# Patient Record
Sex: FEMALE | Race: WHITE | Employment: OTHER | URBAN - METROPOLITAN AREA
[De-identification: names, ages, dates, MRNs, and addresses within clinical notes are randomized per-mention and may not be internally consistent; named-entity substitution may affect disease eponyms.]

---

## 2017-11-07 PROBLEM — S46.911A RIGHT SHOULDER STRAIN: Status: ACTIVE | Noted: 2017-11-07

## 2019-01-24 PROBLEM — R10.9 LEFT FLANK PAIN: Status: ACTIVE | Noted: 2019-01-24

## 2019-01-24 PROBLEM — S46.911A RIGHT SHOULDER STRAIN: Status: RESOLVED | Noted: 2017-11-07 | Resolved: 2019-01-24

## 2019-01-24 PROBLEM — N20.0 KIDNEY STONE: Status: ACTIVE | Noted: 2019-01-24

## 2019-01-24 PROBLEM — M54.32 SCIATICA, LEFT SIDE: Status: ACTIVE | Noted: 2019-01-24

## 2019-01-24 PROBLEM — S46.001A ROTATOR CUFF INJURY, RIGHT, INITIAL ENCOUNTER: Status: RESOLVED | Noted: 2017-11-07 | Resolved: 2019-01-24

## 2020-01-07 PROBLEM — M54.32 SCIATICA, LEFT SIDE: Status: RESOLVED | Noted: 2019-01-24 | Resolved: 2020-01-07

## 2020-01-07 PROBLEM — R10.84 GENERALIZED ABDOMINAL PAIN: Status: RESOLVED | Noted: 2019-10-29 | Resolved: 2020-01-07

## 2020-11-20 DIAGNOSIS — R05.9 COUGH: ICD-10-CM

## 2021-01-07 PROBLEM — E78.49 OTHER HYPERLIPIDEMIA: Status: ACTIVE | Noted: 2021-01-07

## 2021-01-12 DIAGNOSIS — E55.9 VITAMIN D DEFICIENCY: ICD-10-CM

## 2021-01-12 DIAGNOSIS — E78.49 OTHER HYPERLIPIDEMIA: ICD-10-CM

## 2021-01-12 DIAGNOSIS — Z00.01 ENCOUNTER FOR GENERAL ADULT MEDICAL EXAMINATION WITH ABNORMAL FINDINGS: ICD-10-CM

## 2021-01-12 LAB
ALBUMIN SERPL-MCNC: 4.4 G/DL (ref 3.5–5.2)
ALP BLD-CCNC: 51 U/L (ref 35–104)
ALT SERPL-CCNC: 17 U/L (ref 0–32)
ANION GAP SERPL CALCULATED.3IONS-SCNC: 15 MMOL/L (ref 7–16)
AST SERPL-CCNC: 24 U/L (ref 0–31)
BILIRUB SERPL-MCNC: 0.4 MG/DL (ref 0–1.2)
BUN BLDV-MCNC: 14 MG/DL (ref 8–23)
CALCIUM SERPL-MCNC: 9.8 MG/DL (ref 8.6–10.2)
CHLORIDE BLD-SCNC: 108 MMOL/L (ref 98–107)
CHOLESTEROL, TOTAL: 233 MG/DL (ref 0–199)
CO2: 20 MMOL/L (ref 22–29)
CREAT SERPL-MCNC: 0.7 MG/DL (ref 0.5–1)
GFR AFRICAN AMERICAN: >60
GFR NON-AFRICAN AMERICAN: >60 ML/MIN/1.73
GLUCOSE BLD-MCNC: 89 MG/DL (ref 74–99)
HCT VFR BLD CALC: 40 % (ref 34–48)
HDLC SERPL-MCNC: 47 MG/DL
HEMOGLOBIN: 13 G/DL (ref 11.5–15.5)
LDL CHOLESTEROL CALCULATED: 159 MG/DL (ref 0–99)
MCH RBC QN AUTO: 30.1 PG (ref 26–35)
MCHC RBC AUTO-ENTMCNC: 32.5 % (ref 32–34.5)
MCV RBC AUTO: 92.6 FL (ref 80–99.9)
PDW BLD-RTO: 12.4 FL (ref 11.5–15)
PLATELET # BLD: 239 E9/L (ref 130–450)
PMV BLD AUTO: 11.2 FL (ref 7–12)
POTASSIUM SERPL-SCNC: 4.1 MMOL/L (ref 3.5–5)
RBC # BLD: 4.32 E12/L (ref 3.5–5.5)
SODIUM BLD-SCNC: 143 MMOL/L (ref 132–146)
TOTAL PROTEIN: 7.4 G/DL (ref 6.4–8.3)
TRIGL SERPL-MCNC: 133 MG/DL (ref 0–149)
TSH SERPL DL<=0.05 MIU/L-ACNC: 1.98 UIU/ML (ref 0.27–4.2)
VITAMIN D 25-HYDROXY: 28 NG/ML (ref 30–100)
VLDLC SERPL CALC-MCNC: 27 MG/DL
WBC # BLD: 5.2 E9/L (ref 4.5–11.5)

## 2023-01-23 PROBLEM — I47.10 SUPRAVENTRICULAR TACHYCARDIA: Status: ACTIVE | Noted: 2023-01-23

## 2023-01-24 PROBLEM — F41.9 ANXIETY: Status: ACTIVE | Noted: 2023-01-24

## 2023-01-24 PROBLEM — I47.19 ATRIAL TACHYCARDIA: Status: ACTIVE | Noted: 2023-01-23

## 2023-02-02 PROBLEM — R00.0 TACHYCARDIA: Status: RESOLVED | Noted: 2023-01-23 | Resolved: 2023-02-02

## 2023-05-04 PROBLEM — I47.10 SUPRAVENTRICULAR TACHYCARDIA: Status: RESOLVED | Noted: 2023-01-23 | Resolved: 2023-05-04

## 2023-05-04 PROBLEM — I47.1 SUPRAVENTRICULAR TACHYCARDIA (HCC): Status: RESOLVED | Noted: 2023-01-23 | Resolved: 2023-05-04

## 2023-05-04 PROBLEM — E78.00 PURE HYPERCHOLESTEROLEMIA: Status: RESOLVED | Noted: 2023-01-23 | Resolved: 2023-05-04

## 2023-05-15 PROBLEM — I87.2 VENOUS INSUFFICIENCY OF BOTH LOWER EXTREMITIES: Status: ACTIVE | Noted: 2023-05-15

## 2023-06-12 PROBLEM — F32.A ANXIETY AND DEPRESSION: Status: ACTIVE | Noted: 2023-01-24

## 2023-06-12 PROBLEM — U09.9 COVID-19 LONG HAULER MANIFESTING CHRONIC CONCENTRATION DEFICIT: Status: ACTIVE | Noted: 2023-06-12

## 2023-06-12 PROBLEM — F80.2: Status: ACTIVE | Noted: 2023-06-12

## 2023-06-12 PROBLEM — T88.1XXA POST-IMMUNIZATION REACTION: Status: ACTIVE | Noted: 2023-06-12

## 2023-07-18 DIAGNOSIS — K51.00 ULCERATIVE PANCOLITIS WITHOUT COMPLICATION (HCC): ICD-10-CM

## 2023-07-18 LAB
C-REACTIVE PROTEIN: <3 MG/L (ref 0–5)
FOLATE: >20 NG/ML (ref 4.8–24.2)
IRON SATURATION: 38 % (ref 15–50)
IRON: 103 UG/DL (ref 37–145)
SEDIMENTATION RATE, ERYTHROCYTE: 21 MM/HR (ref 0–20)
TOTAL IRON BINDING CAPACITY: 273 UG/DL (ref 250–450)
VITAMIN B-12: 533 PG/ML (ref 211–946)
VITAMIN D 25-HYDROXY: 71.3 NG/ML (ref 30–100)

## 2023-08-09 NOTE — PROGRESS NOTES
[address] PRE-ADMISSION TESTING INSTRUCTIONS    The Preadmission Testing patient is instructed accordingly using the following criteria (check applicable):    ARRIVAL INSTRUCTIONS:  [x] Parking the day of Surgery is located in the Main Entrance lot. Upon entering the door, make an immediate right to the surgery reception desk    [x] Bring photo ID and insurance card    [] Bring in a copy of Living will or Durable Power of  papers. [x] Please be sure to arrange for responsible adult to provide transportation to and from the hospital    [x] Please arrange for responsible adult to be with you for the 24 hour period post procedure due to having anesthesia    [x] If you awake am of surgery not feeling well or have temperature >100 please call [phone]    GENERAL INSTRUCTIONS:    [x] Nothing by mouth after midnight, including gum, candy, mints or water    [x] You may brush your teeth, but do not swallow any water    [x] Take medications as instructed with 1-2 oz of water    [x] Stop herbal supplements and vitamins 5 days prior to procedure    [] Follow preop dosing of blood thinners per physician instructions    [] Take 1/2 dose of evening insulin, but no insulin after midnight    [] No oral diabetic medications after midnight    [] If diabetic and have low blood sugar or feel symptomatic, take 1-2oz apple juice only    [] Bring inhalers day of surgery    [] Bring C-PAP/ Bi-Pap day of surgery    [] Bring urine specimen day of surgery    [x] Shower or bath with soap, lather and rinse well, AM of Surgery, no lotion, powders or creams to surgical site    [x] Follow bowel prep as instructed per surgeon    [x] No tobacco products within 24 hours of surgery     [x] No alcohol or illegal drug use within 24 hours of surgery.     [x] Jewelry, body piercing's, eyeglasses, contact lenses and dentures are not permitted into surgery (bring cases)      [x] Please do not wear any nail polish,

## 2023-08-14 ENCOUNTER — ANESTHESIA EVENT (OUTPATIENT)
Dept: ENDOSCOPY | Age: 68
End: 2023-08-14
Payer: COMMERCIAL

## 2023-08-14 ENCOUNTER — ANESTHESIA (OUTPATIENT)
Dept: ENDOSCOPY | Age: 68
End: 2023-08-14
Payer: COMMERCIAL

## 2023-08-14 ENCOUNTER — HOSPITAL ENCOUNTER (OUTPATIENT)
Age: 68
Setting detail: OUTPATIENT SURGERY
Discharge: HOME | End: 2023-08-14
Attending: STUDENT IN AN ORGANIZED HEALTH CARE EDUCATION/TRAINING PROGRAM | Admitting: STUDENT IN AN ORGANIZED HEALTH CARE EDUCATION/TRAINING PROGRAM
Payer: COMMERCIAL

## 2023-08-14 VITALS
HEART RATE: 69 BPM | SYSTOLIC BLOOD PRESSURE: 126 MMHG | OXYGEN SATURATION: 99 % | BODY MASS INDEX: 25.03 KG/M2 | TEMPERATURE: 96.8 F | DIASTOLIC BLOOD PRESSURE: 61 MMHG | RESPIRATION RATE: 18 BRPM | HEIGHT: 62 IN | WEIGHT: 136 LBS

## 2023-08-14 DIAGNOSIS — K51.90 ULCERATIVE COLITIS (HCC): ICD-10-CM

## 2023-08-14 PROCEDURE — 3609010300 HC COLONOSCOPY W/BIOPSY SINGLE/MULTIPLE: Performed by: STUDENT IN AN ORGANIZED HEALTH CARE EDUCATION/TRAINING PROGRAM

## 2023-08-14 PROCEDURE — 7100000010 HC PHASE II RECOVERY - FIRST 15 MIN: Performed by: STUDENT IN AN ORGANIZED HEALTH CARE EDUCATION/TRAINING PROGRAM

## 2023-08-14 PROCEDURE — 2580000003 HC RX 258: Performed by: NURSE ANESTHETIST, CERTIFIED REGISTERED

## 2023-08-14 PROCEDURE — 6360000002 HC RX W HCPCS: Performed by: NURSE ANESTHETIST, CERTIFIED REGISTERED

## 2023-08-14 PROCEDURE — 2709999900 HC NON-CHARGEABLE SUPPLY: Performed by: STUDENT IN AN ORGANIZED HEALTH CARE EDUCATION/TRAINING PROGRAM

## 2023-08-14 PROCEDURE — 3700000000 HC ANESTHESIA ATTENDED CARE: Performed by: STUDENT IN AN ORGANIZED HEALTH CARE EDUCATION/TRAINING PROGRAM

## 2023-08-14 PROCEDURE — 2500000003 HC RX 250 WO HCPCS: Performed by: NURSE ANESTHETIST, CERTIFIED REGISTERED

## 2023-08-14 PROCEDURE — 7100000000 HC PACU RECOVERY - FIRST 15 MIN: Performed by: STUDENT IN AN ORGANIZED HEALTH CARE EDUCATION/TRAINING PROGRAM

## 2023-08-14 PROCEDURE — 7100000011 HC PHASE II RECOVERY - ADDTL 15 MIN: Performed by: STUDENT IN AN ORGANIZED HEALTH CARE EDUCATION/TRAINING PROGRAM

## 2023-08-14 PROCEDURE — 3700000001 HC ADD 15 MINUTES (ANESTHESIA): Performed by: STUDENT IN AN ORGANIZED HEALTH CARE EDUCATION/TRAINING PROGRAM

## 2023-08-14 PROCEDURE — 45380 COLONOSCOPY AND BIOPSY: CPT | Performed by: STUDENT IN AN ORGANIZED HEALTH CARE EDUCATION/TRAINING PROGRAM

## 2023-08-14 RX ORDER — SODIUM CHLORIDE 0.9 % (FLUSH) 0.9 %
5-40 SYRINGE (ML) INJECTION EVERY 12 HOURS SCHEDULED
Status: CANCELLED | OUTPATIENT
Start: 2023-08-14

## 2023-08-14 RX ORDER — ONDANSETRON 2 MG/ML
4 INJECTION INTRAMUSCULAR; INTRAVENOUS EVERY 6 HOURS PRN
Status: CANCELLED | OUTPATIENT
Start: 2023-08-14

## 2023-08-14 RX ORDER — SODIUM CHLORIDE 9 MG/ML
INJECTION, SOLUTION INTRAVENOUS PRN
Status: CANCELLED | OUTPATIENT
Start: 2023-08-14

## 2023-08-14 RX ORDER — SODIUM CHLORIDE 0.9 % (FLUSH) 0.9 %
5-40 SYRINGE (ML) INJECTION PRN
Status: CANCELLED | OUTPATIENT
Start: 2023-08-14

## 2023-08-14 RX ORDER — LIDOCAINE HYDROCHLORIDE 20 MG/ML
INJECTION, SOLUTION INFILTRATION; PERINEURAL PRN
Status: DISCONTINUED | OUTPATIENT
Start: 2023-08-14 | End: 2023-08-14 | Stop reason: SDUPTHER

## 2023-08-14 RX ORDER — PROPOFOL 10 MG/ML
INJECTION, EMULSION INTRAVENOUS PRN
Status: DISCONTINUED | OUTPATIENT
Start: 2023-08-14 | End: 2023-08-14 | Stop reason: SDUPTHER

## 2023-08-14 RX ORDER — SODIUM CHLORIDE 9 MG/ML
INJECTION, SOLUTION INTRAVENOUS CONTINUOUS PRN
Status: DISCONTINUED | OUTPATIENT
Start: 2023-08-14 | End: 2023-08-14 | Stop reason: SDUPTHER

## 2023-08-14 RX ORDER — ONDANSETRON 4 MG/1
4 TABLET, ORALLY DISINTEGRATING ORAL EVERY 8 HOURS PRN
Status: CANCELLED | OUTPATIENT
Start: 2023-08-14

## 2023-08-14 RX ADMIN — SODIUM CHLORIDE: 9 INJECTION, SOLUTION INTRAVENOUS at 08:56

## 2023-08-14 RX ADMIN — LIDOCAINE HYDROCHLORIDE 40 MG: 20 INJECTION, SOLUTION INFILTRATION; PERINEURAL at 08:56

## 2023-08-14 RX ADMIN — PROPOFOL 300 MG: 10 INJECTION, EMULSION INTRAVENOUS at 08:56

## 2023-08-14 ASSESSMENT — PAIN - FUNCTIONAL ASSESSMENT: PAIN_FUNCTIONAL_ASSESSMENT: NONE - DENIES PAIN

## 2023-08-14 NOTE — H&P
[facility] ([facility])   Gastroenterology, Hepatology, &  Advanced Endoscopy    Consult       ASSESSMENT AND PLAN:    68y/F with history of Ulcerative Colitis presents for endoscopic surveillance. PLAN:  Colonoscopy today        HISTORY OF PRESENT ILLNESS:      Ms. Paige Garcia is a 68y/F who presents to clinic in follow-up for Ulcerative Colitis. She is currently maintained on mesalamine therapy and reports doing very well. She had a hospitalization for flare-up in the setting of C diff which responded well to steroid therapy and vancomycin. She is having very regular bowel movements with no associated blood in the stool, diarrhea, pain, or urgency. Notably, she also was having mental fog thought secondary to Covid which has been slowly improving. Past Medical History:        Diagnosis Date    Encounter for screening colonoscopy     Hyperlipidemia     Hypertension     Memory loss     Migraines     Vitamin D deficiency 11/11/2016     Past Surgical History:        Procedure Laterality Date    BREAST REDUCTION SURGERY Bilateral 1986    BREAST SURGERY Bilateral 1986    reduction    BREAST SURGERY Right 1981    rt breast exc bx benign    CARPAL TUNNEL RELEASE Bilateral     ROTATOR CUFF REPAIR      TONSILLECTOMY       Social History:    TOBACCO:   reports that she has quit smoking. Her smoking use included cigarettes. She has never used smokeless tobacco.  ETOH:   reports no history of alcohol use. DRUGS:   reports no history of drug use. Family History:       Problem Relation Age of Onset    Breast Cancer Sister 71    Breast Cancer Paternal Aunt 79    Lung Cancer Paternal Uncle     Lung Cancer Paternal Uncle        No current facility-administered medications for this encounter.     Current Outpatient Medications:     venlafaxine (EFFEXOR XR) 37.5 MG extended release capsule, Take 1 capsule by mouth daily, Disp: 30 capsule, Rfl: 1    rosuvastatin (CRESTOR) 5 MG tablet, TAKE ONE TABLET BY MOUTH DAILY, Disp: 90

## 2023-08-14 NOTE — ANESTHESIA POSTPROCEDURE EVALUATION
Department of Anesthesiology  Postprocedure Note    Patient: Darby Madrid  MRN: [id number]  YOB: 1955  Date of evaluation: 8/14/2023      Procedure Summary     Date: 08/14/23 Room / Location: [facility] / [facility]    Anesthesia Start: 3174 Anesthesia Stop: 8259    Procedure: COLONOSCOPY WITH BIOPSY Diagnosis:       Ulcerative colitis ([address])      (Ulcerative colitis ([address]) [K51.90])    Surgeons: Aline Maynard MD Responsible Provider: Theopolis Klinefelter, MD    Anesthesia Type: MAC ASA Status: 3          Anesthesia Type: No value filed.     Angel Phase I: Angel Score: 10    Angel Phase II:        Anesthesia Post Evaluation    Patient location during evaluation: PACU  Patient participation: complete - patient participated  Level of consciousness: awake  Pain score: 1  Airway patency: patent  Nausea & Vomiting: no nausea and no vomiting  Complications: no  Cardiovascular status: hemodynamically stable  Respiratory status: acceptable  Hydration status: euvolemic

## 2023-08-14 NOTE — DISCHARGE INSTRUCTIONS
Recommendations:  -The patient will be observed post procedure until all discharge criteria are met. -Patient has a contact number available for emergencies. The signs and symptoms of potential delayed complications were discussed with the patient.    -Return to normal activities tomorrow. -Written discharge instructions were provided to the patient.    -Await pathology/cytology results.  -Timeframe until next colonoscopy will be discussed in clinic and based on Clinical Guidelines regarding size, number, and pathology of polyps removed as well as adequacy of bowel prep.   -Clinic appointment to be scheduled.

## 2023-08-17 LAB — SURGICAL PATHOLOGY REPORT: NORMAL

## 2023-08-23 RX ORDER — ERGOCALCIFEROL 1.25 MG/1
CAPSULE ORAL
Qty: 4 CAPSULE | Refills: 0 | Status: SHIPPED | OUTPATIENT
Start: 2023-08-23

## 2023-09-18 RX ORDER — ERGOCALCIFEROL 1.25 MG/1
CAPSULE ORAL
Qty: 4 CAPSULE | Refills: 0 | Status: SHIPPED | OUTPATIENT
Start: 2023-09-18

## 2023-10-02 DIAGNOSIS — F41.9 ANXIETY AND DEPRESSION: ICD-10-CM

## 2023-10-02 DIAGNOSIS — F32.A ANXIETY AND DEPRESSION: ICD-10-CM

## 2023-10-03 RX ORDER — VENLAFAXINE HYDROCHLORIDE 37.5 MG/1
37.5 CAPSULE, EXTENDED RELEASE ORAL DAILY
Qty: 30 CAPSULE | Refills: 1 | Status: SHIPPED | OUTPATIENT
Start: 2023-10-03 | End: 2023-12-02

## 2023-10-10 ENCOUNTER — TELEPHONE (OUTPATIENT)
Dept: PRIMARY CARE CLINIC | Age: 68
End: 2023-10-10

## 2023-10-10 DIAGNOSIS — Z12.31 SCREENING MAMMOGRAM, ENCOUNTER FOR: Primary | ICD-10-CM

## 2023-10-16 RX ORDER — ERGOCALCIFEROL 1.25 MG/1
CAPSULE ORAL
Qty: 4 CAPSULE | Refills: 0 | Status: SHIPPED | OUTPATIENT
Start: 2023-10-16

## 2023-10-18 ENCOUNTER — OFFICE VISIT (OUTPATIENT)
Dept: GASTROENTEROLOGY | Age: 68
End: 2023-10-18
Payer: COMMERCIAL

## 2023-10-18 VITALS
HEART RATE: 58 BPM | WEIGHT: 134 LBS | BODY MASS INDEX: 24.51 KG/M2 | SYSTOLIC BLOOD PRESSURE: 122 MMHG | TEMPERATURE: 97.4 F | DIASTOLIC BLOOD PRESSURE: 78 MMHG | OXYGEN SATURATION: 98 %

## 2023-10-18 DIAGNOSIS — K51.80 OTHER ULCERATIVE COLITIS WITHOUT COMPLICATION (HCC): Primary | ICD-10-CM

## 2023-10-18 PROCEDURE — G8420 CALC BMI NORM PARAMETERS: HCPCS | Performed by: STUDENT IN AN ORGANIZED HEALTH CARE EDUCATION/TRAINING PROGRAM

## 2023-10-18 PROCEDURE — 1123F ACP DISCUSS/DSCN MKR DOCD: CPT | Performed by: STUDENT IN AN ORGANIZED HEALTH CARE EDUCATION/TRAINING PROGRAM

## 2023-10-18 PROCEDURE — 1090F PRES/ABSN URINE INCON ASSESS: CPT | Performed by: STUDENT IN AN ORGANIZED HEALTH CARE EDUCATION/TRAINING PROGRAM

## 2023-10-18 PROCEDURE — G8484 FLU IMMUNIZE NO ADMIN: HCPCS | Performed by: STUDENT IN AN ORGANIZED HEALTH CARE EDUCATION/TRAINING PROGRAM

## 2023-10-18 PROCEDURE — G8399 PT W/DXA RESULTS DOCUMENT: HCPCS | Performed by: STUDENT IN AN ORGANIZED HEALTH CARE EDUCATION/TRAINING PROGRAM

## 2023-10-18 PROCEDURE — 3017F COLORECTAL CA SCREEN DOC REV: CPT | Performed by: STUDENT IN AN ORGANIZED HEALTH CARE EDUCATION/TRAINING PROGRAM

## 2023-10-18 PROCEDURE — 99212 OFFICE O/P EST SF 10 MIN: CPT | Performed by: STUDENT IN AN ORGANIZED HEALTH CARE EDUCATION/TRAINING PROGRAM

## 2023-10-18 PROCEDURE — 1036F TOBACCO NON-USER: CPT | Performed by: STUDENT IN AN ORGANIZED HEALTH CARE EDUCATION/TRAINING PROGRAM

## 2023-10-18 PROCEDURE — G8427 DOCREV CUR MEDS BY ELIG CLIN: HCPCS | Performed by: STUDENT IN AN ORGANIZED HEALTH CARE EDUCATION/TRAINING PROGRAM

## 2023-10-30 RX ORDER — ERGOCALCIFEROL 1.25 MG/1
CAPSULE ORAL
Qty: 4 CAPSULE | Refills: 0 | Status: SHIPPED | OUTPATIENT
Start: 2023-10-30

## 2023-11-06 RX ORDER — ROSUVASTATIN CALCIUM 5 MG/1
TABLET, COATED ORAL
Qty: 90 TABLET | Refills: 0 | Status: SHIPPED | OUTPATIENT
Start: 2023-11-06

## 2023-11-13 ENCOUNTER — OFFICE VISIT (OUTPATIENT)
Dept: PRIMARY CARE CLINIC | Age: 68
End: 2023-11-13
Payer: COMMERCIAL

## 2023-11-13 VITALS
WEIGHT: 134 LBS | RESPIRATION RATE: 16 BRPM | HEIGHT: 62 IN | HEART RATE: 52 BPM | SYSTOLIC BLOOD PRESSURE: 116 MMHG | OXYGEN SATURATION: 99 % | DIASTOLIC BLOOD PRESSURE: 68 MMHG | BODY MASS INDEX: 24.66 KG/M2

## 2023-11-13 DIAGNOSIS — K51.919 ULCERATIVE COLITIS WITH COMPLICATION, UNSPECIFIED LOCATION (HCC): ICD-10-CM

## 2023-11-13 DIAGNOSIS — Z00.01 ENCOUNTER FOR GENERAL ADULT MEDICAL EXAMINATION WITH ABNORMAL FINDINGS: Primary | ICD-10-CM

## 2023-11-13 DIAGNOSIS — F41.9 ANXIETY: ICD-10-CM

## 2023-11-13 DIAGNOSIS — R53.82 CHRONIC FATIGUE: ICD-10-CM

## 2023-11-13 DIAGNOSIS — E55.9 VITAMIN D DEFICIENCY: ICD-10-CM

## 2023-11-13 DIAGNOSIS — E78.2 MIXED HYPERLIPIDEMIA: ICD-10-CM

## 2023-11-13 DIAGNOSIS — G47.09 OTHER INSOMNIA: ICD-10-CM

## 2023-11-13 DIAGNOSIS — I87.2 VENOUS INSUFFICIENCY OF BOTH LOWER EXTREMITIES: ICD-10-CM

## 2023-11-13 PROCEDURE — 99397 PER PM REEVAL EST PAT 65+ YR: CPT | Performed by: FAMILY MEDICINE

## 2023-11-13 PROCEDURE — G8484 FLU IMMUNIZE NO ADMIN: HCPCS | Performed by: FAMILY MEDICINE

## 2023-11-13 ASSESSMENT — ENCOUNTER SYMPTOMS
COLOR CHANGE: 0
BACK PAIN: 0
RHINORRHEA: 0
COUGH: 0
CONSTIPATION: 0
SHORTNESS OF BREATH: 0
VOMITING: 0
APNEA: 0
NAUSEA: 0
SINUS PRESSURE: 0
EYE PAIN: 0
VISUAL CHANGE: 0
DIARRHEA: 0
SORE THROAT: 0
EYE REDNESS: 0
EYE ITCHING: 0
WHEEZING: 0
ABDOMINAL PAIN: 0
CHEST TIGHTNESS: 0
BLOOD IN STOOL: 0

## 2023-11-13 NOTE — PROGRESS NOTES
Pupils: Pupils are equal, round, and reactive to light. Neck:      Thyroid: No thyromegaly. Cardiovascular:      Rate and Rhythm: Normal rate and regular rhythm. Heart sounds: Normal heart sounds. No murmur heard. Pulmonary:      Effort: Pulmonary effort is normal. No respiratory distress. Breath sounds: Normal breath sounds. No wheezing or rales. Abdominal:      General: Bowel sounds are normal. There is no distension. Palpations: Abdomen is soft. Tenderness: There is no abdominal tenderness. Musculoskeletal:         General: No tenderness. Normal range of motion. Cervical back: Normal range of motion and neck supple. No rigidity. No muscular tenderness. Lymphadenopathy:      Cervical: No cervical adenopathy. Skin:     General: Skin is warm and dry. Findings: No erythema or rash. Neurological:      General: No focal deficit present. Mental Status: She is alert and oriented to person, place, and time. Cranial Nerves: No cranial nerve deficit. Deep Tendon Reflexes: Reflexes are normal and symmetric. Reflexes normal.   Psychiatric:         Mood and Affect: Mood normal.                                 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 Diagnoses and all orders for this visit:    Encounter for general adult medical examination with abnormal findings  -     Comprehensive Metabolic Panel;  Future  -     Lipid Panel;

## 2023-11-14 ENCOUNTER — HOSPITAL ENCOUNTER (OUTPATIENT)
Dept: GENERAL RADIOLOGY | Age: 68
Discharge: HOME | End: 2023-11-16
Payer: COMMERCIAL

## 2023-11-14 VITALS — BODY MASS INDEX: 24.66 KG/M2 | WEIGHT: 134 LBS | HEIGHT: 62 IN

## 2023-11-14 DIAGNOSIS — Z12.31 SCREENING MAMMOGRAM, ENCOUNTER FOR: ICD-10-CM

## 2023-11-14 PROCEDURE — 77063 BREAST TOMOSYNTHESIS BI: CPT

## 2023-11-14 RX ORDER — ERGOCALCIFEROL 1.25 MG/1
CAPSULE ORAL
Qty: 4 CAPSULE | Refills: 0 | Status: SHIPPED | OUTPATIENT
Start: 2023-11-14

## 2023-11-15 ENCOUNTER — PATIENT MESSAGE (OUTPATIENT)
Dept: GASTROENTEROLOGY | Age: 68
End: 2023-11-15

## 2023-11-15 RX ORDER — MESALAMINE 1.2 G/1
1200 TABLET, DELAYED RELEASE ORAL
Qty: 30 TABLET | Refills: 5 | Status: SHIPPED | OUTPATIENT
Start: 2023-11-15

## 2023-12-11 RX ORDER — ERGOCALCIFEROL 1.25 MG/1
CAPSULE ORAL
Qty: 4 CAPSULE | Refills: 0 | Status: SHIPPED
Start: 2023-12-11 | End: 2024-01-08

## 2024-01-05 PROCEDURE — 99285 EMERGENCY DEPT VISIT HI MDM: CPT

## 2024-01-06 ENCOUNTER — APPOINTMENT (OUTPATIENT)
Dept: GENERAL RADIOLOGY | Age: 69
End: 2024-01-06
Payer: COMMERCIAL

## 2024-01-06 ENCOUNTER — HOSPITAL ENCOUNTER (EMERGENCY)
Age: 69
Discharge: HOME | End: 2024-01-06
Attending: STUDENT IN AN ORGANIZED HEALTH CARE EDUCATION/TRAINING PROGRAM
Payer: COMMERCIAL

## 2024-01-06 VITALS
HEIGHT: 62 IN | HEART RATE: 56 BPM | SYSTOLIC BLOOD PRESSURE: 117 MMHG | TEMPERATURE: 97.9 F | WEIGHT: 137 LBS | OXYGEN SATURATION: 96 % | RESPIRATION RATE: 16 BRPM | DIASTOLIC BLOOD PRESSURE: 69 MMHG | BODY MASS INDEX: 25.21 KG/M2

## 2024-01-06 DIAGNOSIS — L50.9 URTICARIA: ICD-10-CM

## 2024-01-06 DIAGNOSIS — R07.89 ATYPICAL CHEST PAIN: Primary | ICD-10-CM

## 2024-01-06 LAB
ALBUMIN SERPL-MCNC: 4.6 G/DL (ref 3.5–5.2)
ALP SERPL-CCNC: 52 U/L (ref 35–104)
ALT SERPL-CCNC: 26 U/L (ref 0–32)
ANION GAP SERPL CALCULATED.3IONS-SCNC: 10 MMOL/L (ref 7–16)
AST SERPL-CCNC: 28 U/L (ref 0–31)
BASOPHILS # BLD: 0.04 K/UL (ref 0–0.2)
BASOPHILS NFR BLD: 1 % (ref 0–2)
BILIRUB SERPL-MCNC: 0.4 MG/DL (ref 0–1.2)
BUN SERPL-MCNC: 18 MG/DL (ref 6–23)
CALCIUM SERPL-MCNC: 9.8 MG/DL (ref 8.6–10.2)
CHLORIDE SERPL-SCNC: 104 MMOL/L (ref 98–107)
CO2 SERPL-SCNC: 27 MMOL/L (ref 22–29)
CREAT SERPL-MCNC: 0.8 MG/DL (ref 0.5–1)
EOSINOPHIL # BLD: 0.15 K/UL (ref 0.05–0.5)
EOSINOPHILS RELATIVE PERCENT: 2 % (ref 0–6)
ERYTHROCYTE [DISTWIDTH] IN BLOOD BY AUTOMATED COUNT: 11.9 % (ref 11.5–15)
GFR SERPL CREATININE-BSD FRML MDRD: >60 ML/MIN/1.73M2
GLUCOSE SERPL-MCNC: 100 MG/DL (ref 74–99)
HCT VFR BLD AUTO: 36.1 % (ref 34–48)
HGB BLD-MCNC: 12 G/DL (ref 11.5–15.5)
IMM GRANULOCYTES # BLD AUTO: <0.03 K/UL (ref 0–0.58)
IMM GRANULOCYTES NFR BLD: 0 % (ref 0–5)
LYMPHOCYTES NFR BLD: 2.17 K/UL (ref 1.5–4)
LYMPHOCYTES RELATIVE PERCENT: 35 % (ref 20–42)
MCH RBC QN AUTO: 31.3 PG (ref 26–35)
MCHC RBC AUTO-ENTMCNC: 33.2 G/DL (ref 32–34.5)
MCV RBC AUTO: 94 FL (ref 80–99.9)
MONOCYTES NFR BLD: 0.54 K/UL (ref 0.1–0.95)
MONOCYTES NFR BLD: 9 % (ref 2–12)
NEUTROPHILS NFR BLD: 53 % (ref 43–80)
NEUTS SEG NFR BLD: 3.24 K/UL (ref 1.8–7.3)
PLATELET # BLD AUTO: 210 K/UL (ref 130–450)
PMV BLD AUTO: 9.8 FL (ref 7–12)
POTASSIUM SERPL-SCNC: 3.8 MMOL/L (ref 3.5–5)
PROT SERPL-MCNC: 7.4 G/DL (ref 6.4–8.3)
RBC # BLD AUTO: 3.84 M/UL (ref 3.5–5.5)
SODIUM SERPL-SCNC: 141 MMOL/L (ref 132–146)
TROPONIN I SERPL HS-MCNC: 7 NG/L (ref 0–9)
TROPONIN I SERPL HS-MCNC: <6 NG/L (ref 0–9)
WBC OTHER # BLD: 6.2 K/UL (ref 4.5–11.5)

## 2024-01-06 PROCEDURE — 71045 X-RAY EXAM CHEST 1 VIEW: CPT

## 2024-01-06 PROCEDURE — 80053 COMPREHEN METABOLIC PANEL: CPT

## 2024-01-06 PROCEDURE — 84484 ASSAY OF TROPONIN QUANT: CPT

## 2024-01-06 PROCEDURE — 93005 ELECTROCARDIOGRAM TRACING: CPT | Performed by: STUDENT IN AN ORGANIZED HEALTH CARE EDUCATION/TRAINING PROGRAM

## 2024-01-06 PROCEDURE — 85025 COMPLETE CBC W/AUTO DIFF WBC: CPT

## 2024-01-06 ASSESSMENT — LIFESTYLE VARIABLES
HOW OFTEN DO YOU HAVE A DRINK CONTAINING ALCOHOL: NEVER
HOW MANY STANDARD DRINKS CONTAINING ALCOHOL DO YOU HAVE ON A TYPICAL DAY: PATIENT DOES NOT DRINK

## 2024-01-06 NOTE — ED PROVIDER NOTES
[facility] EMERGENCY DEPARTMENT  EMERGENCY DEPARTMENT ENCOUNTER        Pt Name: Lorrie Burns  MRN: [id number]  Birthdate 1955  Date of evaluation: 1/5/2024  Provider: Dodie Lorenz DO  PCP: Tray Golden DO  Note Started: 1:02 AM EST 1/6/24    CHIEF COMPLAINT       Chief Complaint   Patient presents with    Urticaria     States started last Friday, states has chest pain \"feels like I am being sat on.\" States SOB comes and goes. States took benadryl at 2230.     Chest Pain       HISTORY OF PRESENT ILLNESS: 1 or more Elements   History From: patient    Limitations to history : None    Lorrie Burns is a 68 y.o. female with a history of anxiety and depression and chronic fatigue and psychosensory aphasia presenting to the emergency department complaining of chest pain and urticaria.  Patient states that she was in with her  watching jeopardy when she states that she was sitting with her  watching jeopardy when she felt itchy everywhere and a tightness in her chest.  She did take 2 Benadryl prior to arrival with some improvement.  Denies any fevers, numbness, tingling, unilateral weakness, abdominal pain, nausea, vomiting, diarrhea, history of similar episodes in the past, recent hospitalization, recent illness, or other acute symptoms or concerns.    Nursing Notes were all reviewed and agreed with or any disagreements were addressed in the HPI.    REVIEW OF SYSTEMS :      Review of Systems    Positives and Pertinent negatives as per HPI.     SURGICAL HISTORY     Past Surgical History:   Procedure Laterality Date    BREAST BIOPSY      BREAST REDUCTION SURGERY Bilateral 1986    BREAST SURGERY Bilateral 1986    reduction    BREAST SURGERY Right 1981    rt breast exc bx benign    CARPAL TUNNEL RELEASE Bilateral     COLONOSCOPY N/A 08/14/2023    COLONOSCOPY WITH BIOPSY performed by Tone Layton MD at [facility] ENDOSCOPY    ROTATOR  acute abnormal findings.  Patient remained asymptomatic while in the emergency department.  There was no rash or hives.  No evidence of anaphylaxis.  No allergic reaction while in the emergency department.  Patient was in no distress and remained asymptomatic the entire time.  Patient discharged in stable condition with strict return precautions.  Heart score is 2.  Does not seem cardiac in nature at this time.  She is completely asymptomatic and chest pain-free.  She states it was more of a tightness when she got the hives earlier.          CONSULTS: (Who and What was discussed)  None        I am the Primary Clinician of Record.    FINAL IMPRESSION      1. Atypical chest pain    2. Urticaria          DISPOSITION/PLAN     DISPOSITION Decision To Discharge 01/06/2024 04:21:27 AM      PATIENT REFERRED TO:  Tray Golden DO  [address] [id number]  [phone]    Schedule an appointment as soon as possible for a visit         DISCHARGE MEDICATIONS:  Discharge Medication List as of 1/6/2024  4:25 AM          DISCONTINUED MEDICATIONS:  Discharge Medication List as of 1/6/2024  4:25 AM                 (Please note that portions of this note were completed with a voice recognition program.  Efforts were made to edit the dictations but occasionally words are mis-transcribed.)    Dodie Lorenz DO (electronically signed)           Dodie Lorenz DO  01/06/24 0657       Dodie Lorenz DO  01/06/24 0659

## 2024-01-06 NOTE — DISCHARGE INSTRUCTIONS
Follow-up with family doctor.  Return for any significant worsening symptoms or other acute symptoms or concerns.

## 2024-01-08 LAB
EKG ATRIAL RATE: 54 BPM
EKG P AXIS: 28 DEGREES
EKG P-R INTERVAL: 170 MS
EKG Q-T INTERVAL: 418 MS
EKG QRS DURATION: 86 MS
EKG QTC CALCULATION (BAZETT): 396 MS
EKG R AXIS: -17 DEGREES
EKG T AXIS: 10 DEGREES
EKG VENTRICULAR RATE: 54 BPM

## 2024-01-08 PROCEDURE — 93010 ELECTROCARDIOGRAM REPORT: CPT | Performed by: INTERNAL MEDICINE

## 2024-01-08 RX ORDER — ERGOCALCIFEROL 1.25 MG/1
CAPSULE ORAL
Qty: 4 CAPSULE | Refills: 0 | Status: SHIPPED | OUTPATIENT
Start: 2024-01-08

## 2024-01-31 DIAGNOSIS — E78.2 MIXED HYPERLIPIDEMIA: ICD-10-CM

## 2024-01-31 DIAGNOSIS — E55.9 VITAMIN D DEFICIENCY: ICD-10-CM

## 2024-01-31 DIAGNOSIS — Z00.01 ENCOUNTER FOR GENERAL ADULT MEDICAL EXAMINATION WITH ABNORMAL FINDINGS: ICD-10-CM

## 2024-01-31 LAB
ALBUMIN SERPL-MCNC: 4.7 G/DL (ref 3.5–5.2)
ALP BLD-CCNC: 52 U/L (ref 35–104)
ALT SERPL-CCNC: 18 U/L (ref 0–32)
ANION GAP SERPL CALCULATED.3IONS-SCNC: 16 MMOL/L (ref 7–16)
AST SERPL-CCNC: 29 U/L (ref 0–31)
BILIRUB SERPL-MCNC: 0.4 MG/DL (ref 0–1.2)
BUN BLDV-MCNC: 13 MG/DL (ref 6–23)
CALCIUM SERPL-MCNC: 9.7 MG/DL (ref 8.6–10.2)
CHLORIDE BLD-SCNC: 103 MMOL/L (ref 98–107)
CHOLESTEROL: 194 MG/DL
CO2: 22 MMOL/L (ref 22–29)
CREAT SERPL-MCNC: 0.7 MG/DL (ref 0.5–1)
GFR SERPL CREATININE-BSD FRML MDRD: >60 ML/MIN/1.73M2
GLUCOSE BLD-MCNC: 90 MG/DL (ref 74–99)
HCT VFR BLD CALC: 37.8 % (ref 34–48)
HDLC SERPL-MCNC: 55 MG/DL
HEMOGLOBIN: 12.6 G/DL (ref 11.5–15.5)
LDL CHOLESTEROL: 117 MG/DL
MCH RBC QN AUTO: 31.5 PG (ref 26–35)
MCHC RBC AUTO-ENTMCNC: 33.3 G/DL (ref 32–34.5)
MCV RBC AUTO: 94.5 FL (ref 80–99.9)
PDW BLD-RTO: 11.6 % (ref 11.5–15)
PLATELET # BLD: 198 K/UL (ref 130–450)
PMV BLD AUTO: 10.6 FL (ref 7–12)
POTASSIUM SERPL-SCNC: 3.9 MMOL/L (ref 3.5–5)
RBC # BLD: 4 M/UL (ref 3.5–5.5)
SODIUM BLD-SCNC: 141 MMOL/L (ref 132–146)
TOTAL PROTEIN: 7.6 G/DL (ref 6.4–8.3)
TRIGL SERPL-MCNC: 112 MG/DL
TSH SERPL DL<=0.05 MIU/L-ACNC: 1.86 UIU/ML (ref 0.27–4.2)
VITAMIN D 25-HYDROXY: 93 NG/ML (ref 30–100)
VLDLC SERPL CALC-MCNC: 22 MG/DL
WBC # BLD: 6.4 K/UL (ref 4.5–11.5)

## 2024-02-03 RX ORDER — ERGOCALCIFEROL 1.25 MG/1
CAPSULE ORAL
Qty: 4 CAPSULE | Refills: 5 | Status: SHIPPED | OUTPATIENT
Start: 2024-02-03

## 2024-02-08 RX ORDER — ROSUVASTATIN CALCIUM 5 MG/1
TABLET, COATED ORAL
Qty: 90 TABLET | Refills: 0 | Status: SHIPPED | OUTPATIENT
Start: 2024-02-08

## 2024-02-15 RX ORDER — METOPROLOL TARTRATE 50 MG/1
50 TABLET, FILM COATED ORAL 2 TIMES DAILY
Qty: 180 TABLET | Refills: 3 | Status: SHIPPED | OUTPATIENT
Start: 2024-02-15

## 2024-02-22 ENCOUNTER — TELEPHONE (OUTPATIENT)
Dept: PRIMARY CARE CLINIC | Age: 69
End: 2024-02-22

## 2024-02-22 RX ORDER — AZITHROMYCIN 250 MG/1
TABLET, FILM COATED ORAL
Qty: 6 TABLET | Refills: 0 | Status: SHIPPED | OUTPATIENT
Start: 2024-02-22 | End: 2024-03-03

## 2024-03-01 ENCOUNTER — OFFICE VISIT (OUTPATIENT)
Dept: FAMILY MEDICINE CLINIC | Age: 69
End: 2024-03-01
Payer: COMMERCIAL

## 2024-03-01 VITALS
BODY MASS INDEX: 24.66 KG/M2 | DIASTOLIC BLOOD PRESSURE: 74 MMHG | SYSTOLIC BLOOD PRESSURE: 116 MMHG | HEART RATE: 62 BPM | WEIGHT: 134.8 LBS | OXYGEN SATURATION: 95 % | TEMPERATURE: 98.2 F

## 2024-03-01 DIAGNOSIS — M79.641 RIGHT HAND PAIN: Primary | ICD-10-CM

## 2024-03-01 DIAGNOSIS — M25.531 RIGHT WRIST PAIN: ICD-10-CM

## 2024-03-01 PROCEDURE — G8484 FLU IMMUNIZE NO ADMIN: HCPCS | Performed by: PHYSICIAN ASSISTANT

## 2024-03-01 PROCEDURE — 1036F TOBACCO NON-USER: CPT | Performed by: PHYSICIAN ASSISTANT

## 2024-03-01 PROCEDURE — G8399 PT W/DXA RESULTS DOCUMENT: HCPCS | Performed by: PHYSICIAN ASSISTANT

## 2024-03-01 PROCEDURE — 3017F COLORECTAL CA SCREEN DOC REV: CPT | Performed by: PHYSICIAN ASSISTANT

## 2024-03-01 PROCEDURE — 1090F PRES/ABSN URINE INCON ASSESS: CPT | Performed by: PHYSICIAN ASSISTANT

## 2024-03-01 PROCEDURE — 99204 OFFICE O/P NEW MOD 45 MIN: CPT | Performed by: PHYSICIAN ASSISTANT

## 2024-03-01 PROCEDURE — G8427 DOCREV CUR MEDS BY ELIG CLIN: HCPCS | Performed by: PHYSICIAN ASSISTANT

## 2024-03-01 PROCEDURE — 1123F ACP DISCUSS/DSCN MKR DOCD: CPT | Performed by: PHYSICIAN ASSISTANT

## 2024-03-01 PROCEDURE — G8420 CALC BMI NORM PARAMETERS: HCPCS | Performed by: PHYSICIAN ASSISTANT

## 2024-03-01 NOTE — PROGRESS NOTES
Cancer Paternal Aunt 70    Lung Cancer Paternal Uncle     Lung Cancer Paternal Uncle        Medications:     Current Outpatient Medications:     metoprolol tartrate (LOPRESSOR) 50 MG tablet, Take 1 tablet by mouth 2 times daily, Disp: 180 tablet, Rfl: 3    rosuvastatin (CRESTOR) 5 MG tablet, TAKE ONE TABLET BY MOUTH DAILY, Disp: 90 tablet, Rfl: 0    vitamin D (ERGOCALCIFEROL) 1.25 MG (83196 UT) CAPS capsule, TAKE ONE CAPSULE BY MOUTH ONCE A WEEK, Disp: 4 capsule, Rfl: 5    venlafaxine (EFFEXOR XR) 37.5 MG extended release capsule, Take 1 capsule by mouth daily, Disp: 30 capsule, Rfl: 11    mesalamine (LIALDA) 1.2 g EC tablet, Take 1 tablet by mouth daily (with breakfast), Disp: 30 tablet, Rfl: 5    Multiple Vitamins-Minerals (MULTIVITAMIN ADULT PO), Take 1 tablet by mouth daily, Disp: , Rfl:     Allergies:     Allergies   Allergen Reactions    Ativan [Lorazepam] Other (See Comments)     Hyperactivity, insomnia       Social History:     Social History     Tobacco Use    Smoking status: Former     Types: Cigarettes    Smokeless tobacco: Never   Vaping Use    Vaping Use: Never used   Substance Use Topics    Alcohol use: No    Drug use: No       Patient lives at home.    Physical Exam:     Vitals:    03/01/24 1422   BP: 116/74   Site: Right Upper Arm   Position: Sitting   Pulse: 62   Temp: 98.2 °F (36.8 °C)   TempSrc: Temporal   SpO2: 95%   Weight: 61.1 kg (134 lb 12.8 oz)       Exam:  Physical Exam  Vital signs reviewed and nurse's notes. The patient is not hypoxic.   General: Alert, no acute distress, patient resting comfortably   Skin: warm, intact, no pallor noted   Head: Normocephalic, atraumatic   Eye: Normal conjunctiva   Respiratory: No acute distress   Musculoskeletal: There is no obvious deformity noted to the right shoulder or to the right elbow.  The patient is not really able to move her right wrist or hand because of pain.  The patient has pain mostly to the radial aspect of the wrist with palpation.  We

## 2024-03-05 RX ORDER — METOPROLOL TARTRATE 50 MG/1
50 TABLET, FILM COATED ORAL 2 TIMES DAILY
Qty: 180 TABLET | Refills: 3 | Status: SHIPPED | OUTPATIENT
Start: 2024-03-05

## 2024-03-22 DIAGNOSIS — K51.80 OTHER ULCERATIVE COLITIS WITHOUT COMPLICATION (HCC): ICD-10-CM

## 2024-03-22 LAB
ABSOLUTE IMMATURE GRANULOCYTE: <0.03 K/UL (ref 0–0.58)
ALBUMIN SERPL-MCNC: 4.7 G/DL (ref 3.5–5.2)
ALP BLD-CCNC: 59 U/L (ref 35–104)
ALT SERPL-CCNC: 14 U/L (ref 0–32)
ANION GAP SERPL CALCULATED.3IONS-SCNC: 21 MMOL/L (ref 7–16)
AST SERPL-CCNC: 32 U/L (ref 0–31)
BASOPHILS ABSOLUTE: 0.06 K/UL (ref 0–0.2)
BASOPHILS RELATIVE PERCENT: 1 % (ref 0–2)
BILIRUB SERPL-MCNC: 0.3 MG/DL (ref 0–1.2)
BUN BLDV-MCNC: 17 MG/DL (ref 6–23)
C-REACTIVE PROTEIN: <3 MG/L (ref 0–5)
CALCIUM SERPL-MCNC: 10 MG/DL (ref 8.6–10.2)
CHLORIDE BLD-SCNC: 108 MMOL/L (ref 98–107)
CO2: 17 MMOL/L (ref 22–29)
CREAT SERPL-MCNC: 0.6 MG/DL (ref 0.5–1)
EOSINOPHILS ABSOLUTE: 0.33 K/UL (ref 0.05–0.5)
EOSINOPHILS RELATIVE PERCENT: 5 % (ref 0–6)
GFR SERPL CREATININE-BSD FRML MDRD: >60 ML/MIN/1.73M2
GLUCOSE BLD-MCNC: 85 MG/DL (ref 74–99)
HCT VFR BLD CALC: 40.3 % (ref 34–48)
HEMOGLOBIN: 13.6 G/DL (ref 11.5–15.5)
IMMATURE GRANULOCYTES: 0 % (ref 0–5)
IRON % SATURATION: 40 % (ref 15–50)
IRON: 121 UG/DL (ref 37–145)
LYMPHOCYTES ABSOLUTE: 2 K/UL (ref 1.5–4)
LYMPHOCYTES RELATIVE PERCENT: 32 % (ref 20–42)
MCH RBC QN AUTO: 31.9 PG (ref 26–35)
MCHC RBC AUTO-ENTMCNC: 33.7 G/DL (ref 32–34.5)
MCV RBC AUTO: 94.4 FL (ref 80–99.9)
MONOCYTES ABSOLUTE: 0.43 K/UL (ref 0.1–0.95)
MONOCYTES RELATIVE PERCENT: 7 % (ref 2–12)
NEUTROPHILS ABSOLUTE: 3.36 K/UL (ref 1.8–7.3)
NEUTROPHILS RELATIVE PERCENT: 54 % (ref 43–80)
PDW BLD-RTO: 12.3 % (ref 11.5–15)
PLATELET # BLD: 225 K/UL (ref 130–450)
PMV BLD AUTO: 11.1 FL (ref 7–12)
POTASSIUM SERPL-SCNC: 4.1 MMOL/L (ref 3.5–5)
RBC # BLD: 4.27 M/UL (ref 3.5–5.5)
SODIUM BLD-SCNC: 146 MMOL/L (ref 132–146)
TOTAL IRON BINDING CAPACITY: 304 UG/DL (ref 250–450)
TOTAL PROTEIN: 7.7 G/DL (ref 6.4–8.3)
VITAMIN D 25-HYDROXY: 73.1 NG/ML (ref 30–100)
WBC # BLD: 6.2 K/UL (ref 4.5–11.5)

## 2024-03-23 LAB
FOLATE: >20 NG/ML (ref 4.8–24.2)
SEDIMENTATION RATE, ERYTHROCYTE: 17 MM/HR (ref 0–20)
VITAMIN B-12: 756 PG/ML (ref 211–946)

## 2024-04-24 ENCOUNTER — OFFICE VISIT (OUTPATIENT)
Dept: GASTROENTEROLOGY | Age: 69
End: 2024-04-24
Payer: COMMERCIAL

## 2024-04-24 VITALS
DIASTOLIC BLOOD PRESSURE: 80 MMHG | SYSTOLIC BLOOD PRESSURE: 128 MMHG | HEART RATE: 60 BPM | BODY MASS INDEX: 24.87 KG/M2 | OXYGEN SATURATION: 96 % | WEIGHT: 136 LBS | TEMPERATURE: 97 F

## 2024-04-24 DIAGNOSIS — K51.80 OTHER ULCERATIVE COLITIS WITHOUT COMPLICATION (HCC): Primary | ICD-10-CM

## 2024-04-24 PROCEDURE — 1036F TOBACCO NON-USER: CPT | Performed by: STUDENT IN AN ORGANIZED HEALTH CARE EDUCATION/TRAINING PROGRAM

## 2024-04-24 PROCEDURE — 1090F PRES/ABSN URINE INCON ASSESS: CPT | Performed by: STUDENT IN AN ORGANIZED HEALTH CARE EDUCATION/TRAINING PROGRAM

## 2024-04-24 PROCEDURE — G8399 PT W/DXA RESULTS DOCUMENT: HCPCS | Performed by: STUDENT IN AN ORGANIZED HEALTH CARE EDUCATION/TRAINING PROGRAM

## 2024-04-24 PROCEDURE — G8420 CALC BMI NORM PARAMETERS: HCPCS | Performed by: STUDENT IN AN ORGANIZED HEALTH CARE EDUCATION/TRAINING PROGRAM

## 2024-04-24 PROCEDURE — G8427 DOCREV CUR MEDS BY ELIG CLIN: HCPCS | Performed by: STUDENT IN AN ORGANIZED HEALTH CARE EDUCATION/TRAINING PROGRAM

## 2024-04-24 PROCEDURE — 99212 OFFICE O/P EST SF 10 MIN: CPT | Performed by: STUDENT IN AN ORGANIZED HEALTH CARE EDUCATION/TRAINING PROGRAM

## 2024-04-24 PROCEDURE — 3017F COLORECTAL CA SCREEN DOC REV: CPT | Performed by: STUDENT IN AN ORGANIZED HEALTH CARE EDUCATION/TRAINING PROGRAM

## 2024-04-24 PROCEDURE — 1123F ACP DISCUSS/DSCN MKR DOCD: CPT | Performed by: STUDENT IN AN ORGANIZED HEALTH CARE EDUCATION/TRAINING PROGRAM

## 2024-04-24 RX ORDER — POLYETHYLENE GLYCOL 3350, SODIUM SULFATE, POTASSIUM CHLORIDE, MAGNESIUM SULFATE, AND SODIUM CHLORIDE FOR ORAL SOLUTION 178.7-7.3G
1 KIT ORAL ONCE
Qty: 1 EACH | Refills: 0 | Status: SHIPPED | OUTPATIENT
Start: 2024-04-24 | End: 2024-04-24

## 2024-04-24 RX ORDER — POLYETHYLENE GLYCOL 3350, SODIUM SULFATE, POTASSIUM CHLORIDE, MAGNESIUM SULFATE, AND SODIUM CHLORIDE FOR ORAL SOLUTION 178.7-7.3G
1 KIT ORAL ONCE
Qty: 1 EACH | Refills: 0 | Status: SHIPPED
Start: 2024-04-24 | End: 2024-04-24 | Stop reason: SDUPTHER

## 2024-04-24 NOTE — PROGRESS NOTES
[facility]  Gastroenterology, Hepatology &  Advanced Endoscopy     Progress Note    SUBJECTIVE:      Ms. Lorrie Burns is a 69y/F who presents to clinic in follow-up for Ulcerative Colitis. She had a recent colonoscopy.     Findings:      Normal terminal ileum.  Normal ascending colon. Mild diverticulosis. Biopsied.  Transverse colon with scarring. No active inflammation. Mild diverticulosis. Biopsied.  Normal descending colon and rectum. Biopsied.  Small hemorrhoids.      Pathology:  -- Diagnosis --   A.  Right colon, colonoscopic biopsy: No diagnostic abnormality.   B.  Transverse colon, colonoscopic biopsy: No diagnostic abnormality.   C.  Left colon, colonoscopic biopsy: No diagnostic abnormality.   D.  Rectum, colonoscopic biopsy: No diagnostic abnormality.      She reports feeling very well. She denies abdominal pain and her bowel movements have been regular with no blood appreciated. She is currently on 1.2g of mesalamine daily.       OBJECTIVE      Physical    VITALS:  /80   Pulse 60   Temp 97 °F (36.1 °C)   Wt 61.7 kg (136 lb)   SpO2 96%   BMI 24.87 kg/m²   Physical Exam:  General: Overall well-appearing, NAD  HEENT: PERRLA, EOMI, Anicteric sclera, MMM, no rhinorrhea  Cards: RRR, no LE edema  Resp: Breathing comfortably on room air, good air movement, no use of accessory muscles, no audible wheezing  Abdomen: soft, NT, ND.   Extremities: Moves all extremities, no effusions or bruising.  Skin: No rashes or jaundice  Neuro: A&O x 3, CN grossly intact, non-focal exam       ASSESSMENT AND PLAN      69y/F w/ Ulcerative Colitis currently maintained on decreasing doses of mesalamine. She reports feeling well and is without any symptoms from the Ulcerative Colitis.    PLAN:  - Will continue mesalamine therapy at current dose.  - She will be scheduled for colonoscopy in October.     I will see the patient back in clinic following colonoscopy with labs drawn beforehand.    Thank you for

## 2024-05-09 PROBLEM — K51.90 ULCERATIVE COLITIS (HCC): Status: ACTIVE | Noted: 2019-08-27

## 2024-05-15 RX ORDER — ROSUVASTATIN CALCIUM 5 MG/1
TABLET, COATED ORAL
Qty: 90 TABLET | Refills: 0 | Status: SHIPPED
Start: 2024-05-15 | End: 2024-05-16 | Stop reason: SDUPTHER

## 2024-05-16 RX ORDER — ROSUVASTATIN CALCIUM 5 MG/1
5 TABLET, COATED ORAL DAILY
Qty: 90 TABLET | Refills: 1 | Status: SHIPPED | OUTPATIENT
Start: 2024-05-16

## 2024-06-20 ASSESSMENT — PATIENT HEALTH QUESTIONNAIRE - PHQ9
6. FEELING BAD ABOUT YOURSELF - OR THAT YOU ARE A FAILURE OR HAVE LET YOURSELF OR YOUR FAMILY DOWN: NOT AT ALL
9. THOUGHTS THAT YOU WOULD BE BETTER OFF DEAD, OR OF HURTING YOURSELF: NOT AT ALL
7. TROUBLE CONCENTRATING ON THINGS, SUCH AS READING THE NEWSPAPER OR WATCHING TELEVISION: NOT AT ALL
SUM OF ALL RESPONSES TO PHQ QUESTIONS 1-9: 4
SUM OF ALL RESPONSES TO PHQ9 QUESTIONS 1 & 2: 0
4. FEELING TIRED OR HAVING LITTLE ENERGY: NOT AT ALL
5. POOR APPETITE OR OVEREATING: MORE THAN HALF THE DAYS
10. IF YOU CHECKED OFF ANY PROBLEMS, HOW DIFFICULT HAVE THESE PROBLEMS MADE IT FOR YOU TO DO YOUR WORK, TAKE CARE OF THINGS AT HOME, OR GET ALONG WITH OTHER PEOPLE: NOT DIFFICULT AT ALL
2. FEELING DOWN, DEPRESSED OR HOPELESS: NOT AT ALL
SUM OF ALL RESPONSES TO PHQ QUESTIONS 1-9: 4
1. LITTLE INTEREST OR PLEASURE IN DOING THINGS: NOT AT ALL
8. MOVING OR SPEAKING SO SLOWLY THAT OTHER PEOPLE COULD HAVE NOTICED. OR THE OPPOSITE, BEING SO FIGETY OR RESTLESS THAT YOU HAVE BEEN MOVING AROUND A LOT MORE THAN USUAL: NOT AT ALL
SUM OF ALL RESPONSES TO PHQ QUESTIONS 1-9: 4
SUM OF ALL RESPONSES TO PHQ QUESTIONS 1-9: 4
3. TROUBLE FALLING OR STAYING ASLEEP: MORE THAN HALF THE DAYS

## 2024-06-21 ASSESSMENT — PATIENT HEALTH QUESTIONNAIRE - PHQ9
2. FEELING DOWN, DEPRESSED OR HOPELESS: NOT AT ALL
SUM OF ALL RESPONSES TO PHQ QUESTIONS 1-9: 4
7. TROUBLE CONCENTRATING ON THINGS, SUCH AS READING THE NEWSPAPER OR WATCHING TELEVISION: NOT AT ALL
10. IF YOU CHECKED OFF ANY PROBLEMS, HOW DIFFICULT HAVE THESE PROBLEMS MADE IT FOR YOU TO DO YOUR WORK, TAKE CARE OF THINGS AT HOME, OR GET ALONG WITH OTHER PEOPLE: NOT DIFFICULT AT ALL
4. FEELING TIRED OR HAVING LITTLE ENERGY: NOT AT ALL
1. LITTLE INTEREST OR PLEASURE IN DOING THINGS: NOT AT ALL
3. TROUBLE FALLING OR STAYING ASLEEP: MORE THAN HALF THE DAYS
5. POOR APPETITE OR OVEREATING: MORE THAN HALF THE DAYS
6. FEELING BAD ABOUT YOURSELF - OR THAT YOU ARE A FAILURE OR HAVE LET YOURSELF OR YOUR FAMILY DOWN: NOT AT ALL
9. THOUGHTS THAT YOU WOULD BE BETTER OFF DEAD, OR OF HURTING YOURSELF: NOT AT ALL
8. MOVING OR SPEAKING SO SLOWLY THAT OTHER PEOPLE COULD HAVE NOTICED. OR THE OPPOSITE - BEING SO FIDGETY OR RESTLESS THAT YOU HAVE BEEN MOVING AROUND A LOT MORE THAN USUAL: NOT AT ALL

## 2024-06-25 ENCOUNTER — OFFICE VISIT (OUTPATIENT)
Dept: PRIMARY CARE CLINIC | Age: 69
End: 2024-06-25
Payer: COMMERCIAL

## 2024-06-25 VITALS
HEART RATE: 55 BPM | WEIGHT: 138 LBS | OXYGEN SATURATION: 96 % | DIASTOLIC BLOOD PRESSURE: 78 MMHG | TEMPERATURE: 98 F | HEIGHT: 62 IN | BODY MASS INDEX: 25.4 KG/M2 | SYSTOLIC BLOOD PRESSURE: 130 MMHG

## 2024-06-25 DIAGNOSIS — R53.82 CHRONIC FATIGUE: ICD-10-CM

## 2024-06-25 DIAGNOSIS — E78.2 MIXED HYPERLIPIDEMIA: ICD-10-CM

## 2024-06-25 DIAGNOSIS — R30.0 DYSURIA: ICD-10-CM

## 2024-06-25 DIAGNOSIS — R10.9 BILATERAL FLANK PAIN: ICD-10-CM

## 2024-06-25 DIAGNOSIS — I87.2 VENOUS INSUFFICIENCY OF BOTH LOWER EXTREMITIES: ICD-10-CM

## 2024-06-25 DIAGNOSIS — F32.A ANXIETY AND DEPRESSION: ICD-10-CM

## 2024-06-25 DIAGNOSIS — F41.9 ANXIETY AND DEPRESSION: ICD-10-CM

## 2024-06-25 DIAGNOSIS — R30.0 DYSURIA: Primary | ICD-10-CM

## 2024-06-25 LAB
ALBUMIN: 4.6 G/DL (ref 3.5–5.2)
ALP BLD-CCNC: 55 U/L (ref 35–104)
ALT SERPL-CCNC: 21 U/L (ref 0–32)
ANION GAP SERPL CALCULATED.3IONS-SCNC: 18 MMOL/L (ref 7–16)
AST SERPL-CCNC: 29 U/L (ref 0–31)
BILIRUB SERPL-MCNC: 0.3 MG/DL (ref 0–1.2)
BILIRUBIN, URINE: NEGATIVE
BUN BLDV-MCNC: 17 MG/DL (ref 6–23)
CALCIUM SERPL-MCNC: 10 MG/DL (ref 8.6–10.2)
CHLORIDE BLD-SCNC: 102 MMOL/L (ref 98–107)
CHOLESTEROL, TOTAL: 201 MG/DL
CO2: 22 MMOL/L (ref 22–29)
COLOR, UA: YELLOW
CREAT SERPL-MCNC: 0.7 MG/DL (ref 0.5–1)
GFR, ESTIMATED: >90 ML/MIN/1.73M2
GLUCOSE BLD-MCNC: 85 MG/DL (ref 74–99)
GLUCOSE URINE: NEGATIVE MG/DL
HCT VFR BLD CALC: 41.1 % (ref 34–48)
HDLC SERPL-MCNC: 62 MG/DL
HEMOGLOBIN: 13.3 G/DL (ref 11.5–15.5)
KETONES, URINE: NEGATIVE MG/DL
LDL CHOLESTEROL: 114 MG/DL
LEUKOCYTE ESTERASE, URINE: NEGATIVE
MCH RBC QN AUTO: 30.9 PG (ref 26–35)
MCHC RBC AUTO-ENTMCNC: 32.4 G/DL (ref 32–34.5)
MCV RBC AUTO: 95.6 FL (ref 80–99.9)
NITRITE, URINE: NEGATIVE
PDW BLD-RTO: 12.1 % (ref 11.5–15)
PH, URINE: 6 (ref 5–9)
PLATELET # BLD: 223 K/UL (ref 130–450)
PMV BLD AUTO: 11.2 FL (ref 7–12)
POTASSIUM SERPL-SCNC: 4.6 MMOL/L (ref 3.5–5)
PROTEIN UA: NEGATIVE MG/DL
RBC # BLD: 4.3 M/UL (ref 3.5–5.5)
RBC UA: NORMAL /HPF
SODIUM BLD-SCNC: 142 MMOL/L (ref 132–146)
SPECIFIC GRAVITY UA: 1.02 (ref 1–1.03)
TOTAL PROTEIN: 7.8 G/DL (ref 6.4–8.3)
TRIGL SERPL-MCNC: 126 MG/DL
TSH SERPL DL<=0.05 MIU/L-ACNC: 1.95 UIU/ML (ref 0.27–4.2)
TURBIDITY: CLEAR
URINE HGB: NEGATIVE
UROBILINOGEN, URINE: 0.2 EU/DL (ref 0–1)
VLDLC SERPL CALC-MCNC: 25 MG/DL
WBC # BLD: 6.1 K/UL (ref 4.5–11.5)
WBC UA: NORMAL /HPF

## 2024-06-25 PROCEDURE — G8427 DOCREV CUR MEDS BY ELIG CLIN: HCPCS | Performed by: FAMILY MEDICINE

## 2024-06-25 PROCEDURE — 1123F ACP DISCUSS/DSCN MKR DOCD: CPT | Performed by: FAMILY MEDICINE

## 2024-06-25 PROCEDURE — G8419 CALC BMI OUT NRM PARAM NOF/U: HCPCS | Performed by: FAMILY MEDICINE

## 2024-06-25 PROCEDURE — 99214 OFFICE O/P EST MOD 30 MIN: CPT | Performed by: FAMILY MEDICINE

## 2024-06-25 PROCEDURE — 3017F COLORECTAL CA SCREEN DOC REV: CPT | Performed by: FAMILY MEDICINE

## 2024-06-25 PROCEDURE — G8399 PT W/DXA RESULTS DOCUMENT: HCPCS | Performed by: FAMILY MEDICINE

## 2024-06-25 PROCEDURE — 1090F PRES/ABSN URINE INCON ASSESS: CPT | Performed by: FAMILY MEDICINE

## 2024-06-25 PROCEDURE — 1036F TOBACCO NON-USER: CPT | Performed by: FAMILY MEDICINE

## 2024-06-25 SDOH — ECONOMIC STABILITY: HOUSING INSECURITY
IN THE LAST 12 MONTHS, WAS THERE A TIME WHEN YOU DID NOT HAVE A STEADY PLACE TO SLEEP OR SLEPT IN A SHELTER (INCLUDING NOW)?: NO

## 2024-06-25 SDOH — ECONOMIC STABILITY: FOOD INSECURITY: WITHIN THE PAST 12 MONTHS, YOU WORRIED THAT YOUR FOOD WOULD RUN OUT BEFORE YOU GOT MONEY TO BUY MORE.: NEVER TRUE

## 2024-06-25 SDOH — ECONOMIC STABILITY: FOOD INSECURITY: WITHIN THE PAST 12 MONTHS, THE FOOD YOU BOUGHT JUST DIDN'T LAST AND YOU DIDN'T HAVE MONEY TO GET MORE.: NEVER TRUE

## 2024-06-25 SDOH — ECONOMIC STABILITY: INCOME INSECURITY: HOW HARD IS IT FOR YOU TO PAY FOR THE VERY BASICS LIKE FOOD, HOUSING, MEDICAL CARE, AND HEATING?: NOT HARD AT ALL

## 2024-06-25 ASSESSMENT — ENCOUNTER SYMPTOMS
SHORTNESS OF BREATH: 0
RHINORRHEA: 0
WHEEZING: 0
SORE THROAT: 0
VOMITING: 0
EYE PAIN: 0
BACK PAIN: 0
COLOR CHANGE: 0
NAUSEA: 0
BLOOD IN STOOL: 0
COUGH: 0
DIARRHEA: 0
ABDOMINAL PAIN: 0
EYE REDNESS: 0
VISUAL CHANGE: 0
CONSTIPATION: 0
CHEST TIGHTNESS: 0
APNEA: 0
SINUS PRESSURE: 0
EYE ITCHING: 0

## 2024-06-25 NOTE — PROGRESS NOTES
Chief Complaint:     Chief Complaint   Patient presents with    Dysuria     Patient states urine is yellow for years.  Patient denies burning with urination, blood in urine, pain and fever admits to frequency at night         Dysuria   This is a recurrent problem. The current episode started more than 1 month ago. The problem occurs intermittently. The problem has been unchanged. The patient is experiencing no pain. There has been no fever. She is Sexually active. There is No history of pyelonephritis. Associated symptoms include frequency. Pertinent negatives include no chills, hematuria, hesitancy, nausea, sweats, urgency or vomiting. She has tried nothing for the symptoms. The treatment provided no relief.   Hyperlipidemia  This is a chronic problem. The current episode started more than 1 year ago. Recent lipid tests were reviewed and are variable. She has no history of diabetes, hypothyroidism or obesity. Associated symptoms include leg pain. Pertinent negatives include no chest pain, myalgias or shortness of breath. She is currently on no antihyperlipidemic treatment. The current treatment provides significant improvement of lipids. There are no compliance problems.  Risk factors for coronary artery disease include dyslipidemia and post-menopausal.   Mental Health Problem  Primary symptoms comment: anxiety. The current episode started more than 1 month ago. This is a recurrent problem.   The onset of the illness is precipitated by a stressful event and emotional stress. The degree of incapacity that she is experiencing as a consequence of her illness is mild. Additional symptoms of the illness include insomnia and fatigue. Additional symptoms of the illness do not include hypersomnia, appetite change, unexpected weight change, visual change, headaches or abdominal pain. She does not admit to suicidal ideas. She does not contemplate harming herself.   Fatigue  This is a chronic problem. The current episode

## 2024-06-26 ENCOUNTER — TELEPHONE (OUTPATIENT)
Age: 69
End: 2024-06-26

## 2024-06-26 ENCOUNTER — PREP FOR PROCEDURE (OUTPATIENT)
Age: 69
End: 2024-06-26

## 2024-06-26 PROBLEM — K51.90 MODERATE CHRONIC ULCERATIVE COLITIS (HCC): Status: ACTIVE | Noted: 2024-06-26

## 2024-06-26 NOTE — TELEPHONE ENCOUNTER
Prior Authorization Form:      DEMOGRAPHICS:                     Patient Name:  Lorrie Burns  Patient :  1955            Insurance:  Payor: [employer] / Plan: [employer] [address] / Product Type: *No Product type* /   Insurance ID Number:    Payer/Plan Subscr  Sex Relation Sub. Ins. ID Effective Group Num   1. [employer]* DK BURNS [birth year]/1953 Male Spouse [id number] 10/1/19 [id number]                                   [address]   2. MEDICARE - ME* ENRRIQUE BURNS,* 1955 Female Self [id number] 3/1/22                                    [address]         DIAGNOSIS & PROCEDURE:                       Procedure/Operation: colonoscopy           CPT Code: 39285    Diagnosis:  ulcerative colitis    ICD10 Code: k51.90    Location:  [facility]    Surgeon:  olaf    SCHEDULING INFORMATION:                          Date: 10-14-24    Time: tbd              Anesthesia:  MAC/TIVA                                                        Status:  Outpatient        Special Comments:  na       Electronically signed by VIKASH FRANCO LPN on 2024 at 2:28 PM  [id number]

## 2024-06-27 LAB
CULTURE: NORMAL
CULTURE: NORMAL
SPECIMEN DESCRIPTION: NORMAL

## 2024-07-05 ENCOUNTER — HOSPITAL ENCOUNTER (OUTPATIENT)
Dept: ULTRASOUND IMAGING | Age: 69
Discharge: HOME | End: 2024-07-05
Attending: FAMILY MEDICINE
Payer: COMMERCIAL

## 2024-07-05 DIAGNOSIS — R10.9 BILATERAL FLANK PAIN: ICD-10-CM

## 2024-07-05 PROCEDURE — 76775 US EXAM ABDO BACK WALL LIM: CPT

## 2024-08-13 RX ORDER — MESALAMINE 1.2 G/1
1200 TABLET, DELAYED RELEASE ORAL
Qty: 90 TABLET | Refills: 3 | Status: SHIPPED | OUTPATIENT
Start: 2024-08-13

## 2024-09-17 RX ORDER — ERGOCALCIFEROL 1.25 MG/1
50000 CAPSULE, LIQUID FILLED ORAL WEEKLY
Qty: 4 CAPSULE | Refills: 5 | Status: SHIPPED | OUTPATIENT
Start: 2024-09-17

## 2024-09-24 ENCOUNTER — TELEPHONE (OUTPATIENT)
Age: 69
End: 2024-09-24

## 2024-10-11 ENCOUNTER — TELEPHONE (OUTPATIENT)
Dept: PRIMARY CARE CLINIC | Age: 69
End: 2024-10-11

## 2024-10-11 RX ORDER — BUSPIRONE HYDROCHLORIDE 5 MG/1
5 TABLET ORAL 2 TIMES DAILY
Qty: 60 TABLET | Refills: 0 | Status: SHIPPED | OUTPATIENT
Start: 2024-10-11 | End: 2024-11-10

## 2024-10-11 NOTE — TELEPHONE ENCOUNTER
Ilya notified   
Rx sent  
The pt is having a lot of anxiety, her neurologist suggested she call her PCP and see if she can get on a medication for it   
2.3

## 2024-10-31 ENCOUNTER — TELEPHONE (OUTPATIENT)
Dept: PRIMARY CARE CLINIC | Age: 69
End: 2024-10-31

## 2024-10-31 RX ORDER — AZITHROMYCIN 250 MG/1
TABLET, FILM COATED ORAL
Qty: 6 TABLET | Refills: 0 | Status: SHIPPED | OUTPATIENT
Start: 2024-10-31 | End: 2024-11-10

## 2024-10-31 NOTE — TELEPHONE ENCOUNTER
The pt's  is calling for the pt because she isn't feeling well, she has a cough, runny nose, and chest congestion, he is asking if something can be sent over to the pharmacy for her

## 2024-11-04 ENCOUNTER — OFFICE VISIT (OUTPATIENT)
Dept: FAMILY MEDICINE CLINIC | Age: 69
End: 2024-11-04

## 2024-11-04 VITALS
OXYGEN SATURATION: 99 % | HEART RATE: 93 BPM | BODY MASS INDEX: 25.05 KG/M2 | DIASTOLIC BLOOD PRESSURE: 82 MMHG | TEMPERATURE: 97.3 F | SYSTOLIC BLOOD PRESSURE: 124 MMHG | WEIGHT: 137 LBS

## 2024-11-04 DIAGNOSIS — R05.9 COUGH, UNSPECIFIED TYPE: ICD-10-CM

## 2024-11-04 DIAGNOSIS — J20.9 ACUTE BRONCHITIS, UNSPECIFIED ORGANISM: Primary | ICD-10-CM

## 2024-11-04 LAB
INFLUENZA A ANTIBODY: NORMAL
INFLUENZA B ANTIBODY: NORMAL
Lab: NORMAL
PERFORMING INSTRUMENT: NORMAL
QC PASS/FAIL: NORMAL
SARS-COV-2, POC: NORMAL

## 2024-11-04 RX ORDER — ALBUTEROL SULFATE 90 UG/1
2 INHALANT RESPIRATORY (INHALATION) 4 TIMES DAILY PRN
Qty: 18 G | Refills: 0 | Status: SHIPPED
Start: 2024-11-04 | End: 2024-11-10

## 2024-11-04 RX ORDER — METHYLPREDNISOLONE 4 MG/1
TABLET ORAL
Qty: 1 KIT | Refills: 0 | Status: SHIPPED
Start: 2024-11-04 | End: 2024-11-10

## 2024-11-04 RX ORDER — CEFDINIR 300 MG/1
300 CAPSULE ORAL EVERY 12 HOURS
Qty: 20 CAPSULE | Refills: 0 | Status: SHIPPED
Start: 2024-11-04 | End: 2024-11-10

## 2024-11-04 NOTE — PROGRESS NOTES
Chief Complaint       Cough (X1 week), Congestion, and Dizziness    History of Present Illness   Source of history provided by:  patient and .      Lorrie Burns is a 69 y.o. old female presenting to the walk in clinic for evaluation of nasal congestion, nasal drainage, bilateral ear pressure, mild mixed productive and non-productive cough and sore throat x 7 days. Has been taking allergy medicine OTC without relief.  Additionally, did start Z-Jaime last week which they completed and are not improved.  Denies any fever, chills, wheezing, CP, SOB, or GI symptoms. Denies any hx of asthma, COPD, or tobacco use.  Pt reports known sick exposure to daughter.    ROS    Unless otherwise stated in this report or unable to obtain because of the patient's clinical or mental status as evidenced by the medical record, this patients's positive and negative responses for Review of Systems, constitutional, psych, eyes, ENT, cardiovascular, respiratory, gastrointestinal, neurological, genitourinary, musculoskeletal, integument systems and systems related to the presenting problem are either stated in the preceding or were not pertinent or were negative for the symptoms and/or complaints related to the medical problem.    Physical Exam         VS:  /82   Pulse 93   Temp 97.3 °F (36.3 °C) (Temporal)   Wt 62.1 kg (137 lb)   SpO2 99%   BMI 25.05 kg/m²    Oxygen Saturation Interpretation: Normal.    Constitutional:  Alert, development consistent with age.  Ears:  External Ears: Bilateral pinna normal. TMs translucent without erythema or perforation bilaterally.  Canals normal bilaterally without swelling or exudate  Nose: Mild congestion of the nasal mucosa. There is injection to middle turbinates bilaterally.   Throat: Mild posterior pharyngeal erythema with mild post nasal drip present.  No exudate or tonsillar hypertrophy noted.    Neck:  Supple. There is no anterior cervical adenopathy.  Lungs: End

## 2024-11-10 ENCOUNTER — HOSPITAL ENCOUNTER (EMERGENCY)
Age: 69
Discharge: HOME | End: 2024-11-10
Attending: STUDENT IN AN ORGANIZED HEALTH CARE EDUCATION/TRAINING PROGRAM
Payer: COMMERCIAL

## 2024-11-10 ENCOUNTER — OFFICE VISIT (OUTPATIENT)
Dept: FAMILY MEDICINE CLINIC | Age: 69
End: 2024-11-10
Payer: COMMERCIAL

## 2024-11-10 ENCOUNTER — APPOINTMENT (OUTPATIENT)
Dept: CT IMAGING | Age: 69
End: 2024-11-10
Payer: COMMERCIAL

## 2024-11-10 VITALS
TEMPERATURE: 97.4 F | RESPIRATION RATE: 15 BRPM | SYSTOLIC BLOOD PRESSURE: 145 MMHG | BODY MASS INDEX: 25.21 KG/M2 | WEIGHT: 137 LBS | HEIGHT: 62 IN | DIASTOLIC BLOOD PRESSURE: 67 MMHG | OXYGEN SATURATION: 100 % | HEART RATE: 60 BPM

## 2024-11-10 VITALS
BODY MASS INDEX: 24.84 KG/M2 | HEART RATE: 64 BPM | WEIGHT: 135 LBS | SYSTOLIC BLOOD PRESSURE: 124 MMHG | TEMPERATURE: 97.8 F | HEIGHT: 62 IN | DIASTOLIC BLOOD PRESSURE: 80 MMHG | OXYGEN SATURATION: 97 %

## 2024-11-10 DIAGNOSIS — R19.7 DIARRHEA, UNSPECIFIED TYPE: Primary | ICD-10-CM

## 2024-11-10 DIAGNOSIS — R53.1 WEAKNESS: ICD-10-CM

## 2024-11-10 DIAGNOSIS — K51.911 ULCERATIVE COLITIS WITH RECTAL BLEEDING, UNSPECIFIED LOCATION (HCC): ICD-10-CM

## 2024-11-10 DIAGNOSIS — R10.10 PAIN OF UPPER ABDOMEN: Primary | ICD-10-CM

## 2024-11-10 LAB
ALBUMIN SERPL-MCNC: 4.4 G/DL (ref 3.5–5.2)
ALP SERPL-CCNC: 66 U/L (ref 35–104)
ALT SERPL-CCNC: 14 U/L (ref 0–32)
ANION GAP SERPL CALCULATED.3IONS-SCNC: 11 MMOL/L (ref 7–16)
AST SERPL-CCNC: 22 U/L (ref 0–31)
BASOPHILS # BLD: 0.08 K/UL (ref 0–0.2)
BASOPHILS NFR BLD: 1 % (ref 0–2)
BILIRUB SERPL-MCNC: 0.4 MG/DL (ref 0–1.2)
BUN SERPL-MCNC: 14 MG/DL (ref 6–23)
CALCIUM SERPL-MCNC: 9.8 MG/DL (ref 8.6–10.2)
CHLORIDE SERPL-SCNC: 102 MMOL/L (ref 98–107)
CO2 SERPL-SCNC: 27 MMOL/L (ref 22–29)
CREAT SERPL-MCNC: 0.7 MG/DL (ref 0.5–1)
EOSINOPHIL # BLD: 0.07 K/UL (ref 0.05–0.5)
EOSINOPHILS RELATIVE PERCENT: 1 % (ref 0–6)
ERYTHROCYTE [DISTWIDTH] IN BLOOD BY AUTOMATED COUNT: 11.9 % (ref 11.5–15)
GFR, ESTIMATED: >90 ML/MIN/1.73M2
GLUCOSE SERPL-MCNC: 89 MG/DL (ref 74–99)
HCT VFR BLD AUTO: 39.1 % (ref 34–48)
HGB BLD-MCNC: 13.1 G/DL (ref 11.5–15.5)
IMM GRANULOCYTES # BLD AUTO: 0.06 K/UL (ref 0–0.58)
IMM GRANULOCYTES NFR BLD: 1 % (ref 0–5)
LACTATE BLDV-SCNC: 1 MMOL/L (ref 0.5–2.2)
LIPASE SERPL-CCNC: 31 U/L (ref 13–60)
LYMPHOCYTES NFR BLD: 2.84 K/UL (ref 1.5–4)
LYMPHOCYTES RELATIVE PERCENT: 30 % (ref 20–42)
MCH RBC QN AUTO: 30.7 PG (ref 26–35)
MCHC RBC AUTO-ENTMCNC: 33.5 G/DL (ref 32–34.5)
MCV RBC AUTO: 91.6 FL (ref 80–99.9)
MONOCYTES NFR BLD: 0.73 K/UL (ref 0.1–0.95)
MONOCYTES NFR BLD: 8 % (ref 2–12)
NEUTROPHILS NFR BLD: 60 % (ref 43–80)
NEUTS SEG NFR BLD: 5.6 K/UL (ref 1.8–7.3)
PLATELET # BLD AUTO: 276 K/UL (ref 130–450)
PMV BLD AUTO: 10.2 FL (ref 7–12)
POTASSIUM SERPL-SCNC: 3.7 MMOL/L (ref 3.5–5)
PROT SERPL-MCNC: 7.6 G/DL (ref 6.4–8.3)
RBC # BLD AUTO: 4.27 M/UL (ref 3.5–5.5)
SODIUM SERPL-SCNC: 140 MMOL/L (ref 132–146)
WBC OTHER # BLD: 9.4 K/UL (ref 4.5–11.5)

## 2024-11-10 PROCEDURE — 83690 ASSAY OF LIPASE: CPT

## 2024-11-10 PROCEDURE — 1090F PRES/ABSN URINE INCON ASSESS: CPT | Performed by: PHYSICIAN ASSISTANT

## 2024-11-10 PROCEDURE — 6360000004 HC RX CONTRAST MEDICATION: Performed by: RADIOLOGY

## 2024-11-10 PROCEDURE — G8427 DOCREV CUR MEDS BY ELIG CLIN: HCPCS | Performed by: PHYSICIAN ASSISTANT

## 2024-11-10 PROCEDURE — G8484 FLU IMMUNIZE NO ADMIN: HCPCS | Performed by: PHYSICIAN ASSISTANT

## 2024-11-10 PROCEDURE — 74177 CT ABD & PELVIS W/CONTRAST: CPT

## 2024-11-10 PROCEDURE — G8420 CALC BMI NORM PARAMETERS: HCPCS | Performed by: PHYSICIAN ASSISTANT

## 2024-11-10 PROCEDURE — 80053 COMPREHEN METABOLIC PANEL: CPT

## 2024-11-10 PROCEDURE — 1036F TOBACCO NON-USER: CPT | Performed by: PHYSICIAN ASSISTANT

## 2024-11-10 PROCEDURE — 83605 ASSAY OF LACTIC ACID: CPT

## 2024-11-10 PROCEDURE — 99215 OFFICE O/P EST HI 40 MIN: CPT | Performed by: PHYSICIAN ASSISTANT

## 2024-11-10 PROCEDURE — G8399 PT W/DXA RESULTS DOCUMENT: HCPCS | Performed by: PHYSICIAN ASSISTANT

## 2024-11-10 PROCEDURE — 1123F ACP DISCUSS/DSCN MKR DOCD: CPT | Performed by: PHYSICIAN ASSISTANT

## 2024-11-10 PROCEDURE — 3017F COLORECTAL CA SCREEN DOC REV: CPT | Performed by: PHYSICIAN ASSISTANT

## 2024-11-10 PROCEDURE — 85025 COMPLETE CBC W/AUTO DIFF WBC: CPT

## 2024-11-10 PROCEDURE — 99285 EMERGENCY DEPT VISIT HI MDM: CPT

## 2024-11-10 RX ORDER — IOPAMIDOL 755 MG/ML
75 INJECTION, SOLUTION INTRAVASCULAR
Status: COMPLETED | OUTPATIENT
Start: 2024-11-10 | End: 2024-11-10

## 2024-11-10 RX ADMIN — IOPAMIDOL 75 ML: 755 INJECTION, SOLUTION INTRAVENOUS at 18:09

## 2024-11-10 ASSESSMENT — PAIN DESCRIPTION - FREQUENCY: FREQUENCY: INTERMITTENT

## 2024-11-10 ASSESSMENT — PAIN SCALES - GENERAL: PAINLEVEL_OUTOF10: 5

## 2024-11-10 ASSESSMENT — PAIN DESCRIPTION - LOCATION: LOCATION: ABDOMEN

## 2024-11-10 ASSESSMENT — PAIN - FUNCTIONAL ASSESSMENT: PAIN_FUNCTIONAL_ASSESSMENT: 0-10

## 2024-11-10 NOTE — ED PROVIDER NOTES
Department of Emergency Medicine   ED  Provider Note  Admit Date/RoomTime: 11/10/2024 12:25 PM  ED Room: [address] [place] [address]     Lorrie Burns is a 69 y.o. female with a PMHx significant for  anxiety, fatigue, ulcerative colitis (follows with Dr. Layton), HLD  who presents for evaluation of abdominal pain and diarrhea, beginning prior to arrival.  The complaint has been persistent, moderate in severity, and worsened by nothing.   The patient states that she recently had a URI.  Notes that she was started on some antibiotics and finished that course.  She began having looser diarrhea with some intermittent blood 3 to 4 days ago.  Has had multiple bouts over the weekend.  Does endorse history of having C. difficile in the past after antibiotic use.  Does follow with GI, Dr. Layton.  She is on medications for this, is having worsening abdominal pain and cramping as well.     Review of Systems   Constitutional:  Negative for chills and fever.   HENT:  Negative for ear pain, sinus pressure and sore throat.    Eyes:  Negative for pain, discharge and redness.   Respiratory:  Negative for cough, shortness of breath and wheezing.    Cardiovascular:  Negative for chest pain.   Gastrointestinal:  Positive for abdominal pain and diarrhea. Negative for abdominal distention, nausea and vomiting.   Genitourinary:  Negative for dysuria and frequency.   Musculoskeletal:  Negative for arthralgias and back pain.   Skin:  Negative for rash and wound.   Neurological:  Negative for weakness and headaches.   Hematological:  Negative for adenopathy.   All other systems reviewed and are negative.       Physical Exam  Vitals and nursing note reviewed.   Constitutional:       General: She is not in acute distress.     Appearance: Normal appearance. She is well-developed. She is not ill-appearing.   HENT:      Head: Normocephalic and atraumatic.      Right Ear: External ear normal.      Left Ear: External ear normal.   Eyes:       General:         Right eye: No discharge.         Left eye: No discharge.      Extraocular Movements: Extraocular movements intact.      Conjunctiva/sclera: Conjunctivae normal.   Cardiovascular:      Rate and Rhythm: Normal rate and regular rhythm.      Heart sounds: Normal heart sounds. No murmur heard.  Pulmonary:      Effort: Pulmonary effort is normal. No respiratory distress.      Breath sounds: Normal breath sounds. No stridor.   Abdominal:      General: There is no distension.      Palpations: Abdomen is soft. There is no mass.      Tenderness: There is abdominal tenderness.      Hernia: No hernia is present.   Musculoskeletal:      Cervical back: Normal range of motion and neck supple.   Skin:     General: Skin is warm and dry.   Neurological:      General: No focal deficit present.      Mental Status: She is alert.        Procedures    [facility]       ED Course as of 11/15/24 1424   Sun Nov 10, 2024   1933 Patient was handed to me by Dr. Sanderson awaiting CT imaging results CT does not show any signs of diverticulitis or abscess or just diarrheal changes.  White blood cell count was normal.  I reassessed patient she has no abdominal pain.  She is actually not had any diarrhea while here in the emergency department.  We are unable to send a sample.  She will take the kit home with her she has follow-up appointment on Tuesday this week with Dr. Layton for GI.  They were told return for any new or worsening symptoms.  If she has any recurrent diarrhea she will come back to provide a sample for C. difficile testing.  Stable for outpatient follow-up. [KK]      ED Course User Index  [KK] Rosemary Cary MD        Differential diagnosis: Colitis, diverticulitis, C. difficile to name a few  Review of ED/ Outpatient Records: on chart review patient was seen on 11/10/2024 prior to arrival at [facility], diagnosed with abdominal pain and sent to ER  Historians that case was discussed with: None   My EKG

## 2024-11-11 RX ORDER — BUSPIRONE HYDROCHLORIDE 5 MG/1
5 TABLET ORAL 2 TIMES DAILY
Qty: 60 TABLET | Refills: 0 | Status: SHIPPED | OUTPATIENT
Start: 2024-11-11

## 2024-11-12 ENCOUNTER — OFFICE VISIT (OUTPATIENT)
Dept: GASTROENTEROLOGY | Age: 69
End: 2024-11-12
Payer: COMMERCIAL

## 2024-11-12 VITALS
HEART RATE: 65 BPM | WEIGHT: 137 LBS | DIASTOLIC BLOOD PRESSURE: 50 MMHG | OXYGEN SATURATION: 100 % | HEIGHT: 62 IN | BODY MASS INDEX: 25.21 KG/M2 | SYSTOLIC BLOOD PRESSURE: 138 MMHG | TEMPERATURE: 97 F

## 2024-11-12 DIAGNOSIS — K51.80 OTHER ULCERATIVE COLITIS WITHOUT COMPLICATION (HCC): Primary | ICD-10-CM

## 2024-11-12 DIAGNOSIS — R19.7 DIARRHEA, UNSPECIFIED TYPE: ICD-10-CM

## 2024-11-12 PROCEDURE — G8399 PT W/DXA RESULTS DOCUMENT: HCPCS | Performed by: NURSE PRACTITIONER

## 2024-11-12 PROCEDURE — G8419 CALC BMI OUT NRM PARAM NOF/U: HCPCS | Performed by: NURSE PRACTITIONER

## 2024-11-12 PROCEDURE — 1123F ACP DISCUSS/DSCN MKR DOCD: CPT | Performed by: NURSE PRACTITIONER

## 2024-11-12 PROCEDURE — 3017F COLORECTAL CA SCREEN DOC REV: CPT | Performed by: NURSE PRACTITIONER

## 2024-11-12 PROCEDURE — 1036F TOBACCO NON-USER: CPT | Performed by: NURSE PRACTITIONER

## 2024-11-12 PROCEDURE — 1090F PRES/ABSN URINE INCON ASSESS: CPT | Performed by: NURSE PRACTITIONER

## 2024-11-12 PROCEDURE — G8484 FLU IMMUNIZE NO ADMIN: HCPCS | Performed by: NURSE PRACTITIONER

## 2024-11-12 PROCEDURE — 99212 OFFICE O/P EST SF 10 MIN: CPT | Performed by: NURSE PRACTITIONER

## 2024-11-12 PROCEDURE — G8427 DOCREV CUR MEDS BY ELIG CLIN: HCPCS | Performed by: NURSE PRACTITIONER

## 2024-11-12 NOTE — PROGRESS NOTES
unremarkable  -dietary modification advised, BRAT diet while having diarrhea.    -Advised the flu vaccine  -colonoscopy scheduled for January 2. Diarrhea, unspecified type  -     Clostridium Difficile Toxin/Antigen; Future  -     Culture, Stool; Future    - the diarrhea may be viral vs cdiff.  Stool cultures and cdiff ordered  -may use imodium as directed  -encouraged patient to increase fluid intake      Return for Follow up after scope.    An electronic signature was used to authenticate this note.    --SELVIN Mandel - CNP

## 2024-11-13 DIAGNOSIS — K51.80 OTHER ULCERATIVE COLITIS WITHOUT COMPLICATION (HCC): ICD-10-CM

## 2024-11-13 DIAGNOSIS — R19.7 DIARRHEA, UNSPECIFIED TYPE: ICD-10-CM

## 2024-11-16 LAB
CULTURE: NORMAL
CULTURE: NORMAL
SPECIMEN DESCRIPTION: NORMAL

## 2024-12-09 RX ORDER — BUSPIRONE HYDROCHLORIDE 5 MG/1
5 TABLET ORAL 2 TIMES DAILY
Qty: 60 TABLET | Refills: 0 | Status: SHIPPED | OUTPATIENT
Start: 2024-12-09

## 2024-12-27 ENCOUNTER — TELEPHONE (OUTPATIENT)
Dept: FAMILY MEDICINE CLINIC | Age: 69
End: 2024-12-27

## 2024-12-27 NOTE — TELEPHONE ENCOUNTER
Yes, I can.  I don’t know what my availability looks like as I’m not on a computer but a Provider Fill can be used at the end of Jan, beginning of Feb.  please let them know that I have much more availability in [place]

## 2024-12-27 NOTE — TELEPHONE ENCOUNTER
Spoke with Ilya. They are fine with [place], he said she will just be pleased that she is able to Establish with you. Used a Provider Zak on Tuesday 1/28/25 in [place] at 3:00 PM for NTP appt.    Date, time & location confirmed with Ilya.

## 2024-12-27 NOTE — TELEPHONE ENCOUNTER
Ron calling in; Lorrie was wanting to see if she could Establish with you. They were very pleased with how you took care of Ilya's mother-in-law, Radha. And, Lorrie does not feel like she's getting good answers to her questions she has from her current PCP.    I let him know that you were currently out of office, but as soon as I had a response, I would call him. His number is: [phone], and he is on the Communication form. Please advise.

## 2024-12-30 ENCOUNTER — OFFICE VISIT (OUTPATIENT)
Dept: FAMILY MEDICINE CLINIC | Age: 69
End: 2024-12-30

## 2024-12-30 VITALS
HEART RATE: 84 BPM | DIASTOLIC BLOOD PRESSURE: 70 MMHG | SYSTOLIC BLOOD PRESSURE: 102 MMHG | OXYGEN SATURATION: 96 % | BODY MASS INDEX: 24.36 KG/M2 | WEIGHT: 133.2 LBS | TEMPERATURE: 98.5 F

## 2024-12-30 DIAGNOSIS — K52.9 ACUTE GASTROENTERITIS: Primary | ICD-10-CM

## 2024-12-30 DIAGNOSIS — R19.7 DIARRHEA, UNSPECIFIED TYPE: ICD-10-CM

## 2024-12-30 LAB
INFLUENZA A ANTIBODY: NEGATIVE
INFLUENZA B ANTIBODY: NEGATIVE
Lab: NORMAL
PERFORMING INSTRUMENT: NORMAL
QC PASS/FAIL: NORMAL
SARS-COV-2, POC: NORMAL

## 2024-12-30 RX ORDER — DICYCLOMINE HCL 20 MG
20 TABLET ORAL 4 TIMES DAILY PRN
Qty: 30 TABLET | Refills: 0 | Status: SHIPPED | OUTPATIENT
Start: 2024-12-30

## 2024-12-30 RX ORDER — ONDANSETRON 4 MG/1
4 TABLET, ORALLY DISINTEGRATING ORAL EVERY 8 HOURS PRN
Qty: 21 TABLET | Refills: 0 | Status: SHIPPED | OUTPATIENT
Start: 2024-12-30

## 2024-12-30 NOTE — PROGRESS NOTES
software. The patient (or guardian, if applicable) and other individuals in attendance with the patient were advised that if Artificial Intelligence would be utilized during this visit to record and process the conversation to generate a clinical note they would be informed prior to start of the visit. The patient (or guardian, if applicable) and other individuals in attendance at the appointment consented to the use of AI if was utilized, including the recording.  Every effort was made to ensure accuracy; however, inadvertent computerized transcription errors may be present.

## 2025-01-02 ENCOUNTER — PATIENT MESSAGE (OUTPATIENT)
Dept: GASTROENTEROLOGY | Age: 70
End: 2025-01-02

## 2025-01-03 DIAGNOSIS — K51.00 ULCERATIVE PANCOLITIS WITHOUT COMPLICATION (HCC): ICD-10-CM

## 2025-01-03 DIAGNOSIS — K51.00 ULCERATIVE PANCOLITIS WITHOUT COMPLICATION (HCC): Primary | ICD-10-CM

## 2025-01-03 LAB
ALBUMIN: 4 G/DL (ref 3.5–5.2)
ALP BLD-CCNC: 48 U/L (ref 35–104)
ALT SERPL-CCNC: 11 U/L (ref 0–32)
ANION GAP SERPL CALCULATED.3IONS-SCNC: 18 MMOL/L (ref 7–16)
AST SERPL-CCNC: 24 U/L (ref 0–31)
BASOPHILS ABSOLUTE: 0.04 K/UL (ref 0–0.2)
BASOPHILS RELATIVE PERCENT: 1 % (ref 0–2)
BILIRUB SERPL-MCNC: 0.4 MG/DL (ref 0–1.2)
BUN BLDV-MCNC: 13 MG/DL (ref 6–23)
C-REACTIVE PROTEIN: 68 MG/L (ref 0–5)
CALCIUM SERPL-MCNC: 9.6 MG/DL (ref 8.6–10.2)
CHLORIDE BLD-SCNC: 101 MMOL/L (ref 98–107)
CO2: 22 MMOL/L (ref 22–29)
CREAT SERPL-MCNC: 0.8 MG/DL (ref 0.5–1)
EOSINOPHILS ABSOLUTE: 0.06 K/UL (ref 0.05–0.5)
EOSINOPHILS RELATIVE PERCENT: 1 % (ref 0–6)
GFR, ESTIMATED: 78 ML/MIN/1.73M2
GLUCOSE BLD-MCNC: 89 MG/DL (ref 74–99)
HCT VFR BLD CALC: 37.9 % (ref 34–48)
HEMOGLOBIN: 12.5 G/DL (ref 11.5–15.5)
IMMATURE GRANULOCYTES %: 0 % (ref 0–5)
IMMATURE GRANULOCYTES ABSOLUTE: 0.03 K/UL (ref 0–0.58)
LYMPHOCYTES ABSOLUTE: 1.55 K/UL (ref 1.5–4)
LYMPHOCYTES RELATIVE PERCENT: 20 % (ref 20–42)
MCH RBC QN AUTO: 30.3 PG (ref 26–35)
MCHC RBC AUTO-ENTMCNC: 33 G/DL (ref 32–34.5)
MCV RBC AUTO: 91.8 FL (ref 80–99.9)
MONOCYTES ABSOLUTE: 0.71 K/UL (ref 0.1–0.95)
MONOCYTES RELATIVE PERCENT: 9 % (ref 2–12)
NEUTROPHILS ABSOLUTE: 5.47 K/UL (ref 1.8–7.3)
NEUTROPHILS RELATIVE PERCENT: 70 % (ref 43–80)
PDW BLD-RTO: 12.2 % (ref 11.5–15)
PLATELET # BLD: 241 K/UL (ref 130–450)
PMV BLD AUTO: 10.9 FL (ref 7–12)
POTASSIUM SERPL-SCNC: 3.5 MMOL/L (ref 3.5–5)
RBC # BLD: 4.13 M/UL (ref 3.5–5.5)
SED RATE, AUTOMATED: 70 MM/HR (ref 0–20)
SODIUM BLD-SCNC: 141 MMOL/L (ref 132–146)
TOTAL PROTEIN: 7.3 G/DL (ref 6.4–8.3)
WBC # BLD: 7.9 K/UL (ref 4.5–11.5)

## 2025-01-03 NOTE — TELEPHONE ENCOUNTER
Spoke to Denise, She ordered some labs and said it was ok for her to go back on the mesalamine.   It is the patient's choice if she wants to have the colonoscopy completed but it will give dr babin a better idea on how to treat her symptoms.  Spoke to  and reviewed all this with him.   He will speak with his wife and then let us know. Electronically signed by VIKASH FRANCO LPN on 1/3/2025 at 9:15 AM

## 2025-01-06 ENCOUNTER — APPOINTMENT (OUTPATIENT)
Dept: CT IMAGING | Age: 70
End: 2025-01-06
Payer: MEDICARE

## 2025-01-06 ENCOUNTER — HOSPITAL ENCOUNTER (EMERGENCY)
Age: 70
Discharge: HOME | End: 2025-01-06
Attending: EMERGENCY MEDICINE
Payer: MEDICARE

## 2025-01-06 VITALS
HEART RATE: 114 BPM | RESPIRATION RATE: 14 BRPM | SYSTOLIC BLOOD PRESSURE: 109 MMHG | DIASTOLIC BLOOD PRESSURE: 68 MMHG | OXYGEN SATURATION: 98 % | TEMPERATURE: 98.1 F

## 2025-01-06 DIAGNOSIS — R11.2 NAUSEA VOMITING AND DIARRHEA: Primary | ICD-10-CM

## 2025-01-06 DIAGNOSIS — R19.7 NAUSEA VOMITING AND DIARRHEA: Primary | ICD-10-CM

## 2025-01-06 DIAGNOSIS — K44.9 HIATAL HERNIA: ICD-10-CM

## 2025-01-06 DIAGNOSIS — E86.0 DEHYDRATION: ICD-10-CM

## 2025-01-06 LAB
ALBUMIN SERPL-MCNC: 3.9 G/DL (ref 3.5–5.2)
ALP SERPL-CCNC: 52 U/L (ref 35–104)
ALT SERPL-CCNC: 13 U/L (ref 0–32)
ANION GAP SERPL CALCULATED.3IONS-SCNC: 13 MMOL/L (ref 7–16)
AST SERPL-CCNC: 22 U/L (ref 0–31)
BASOPHILS # BLD: 0.06 K/UL (ref 0–0.2)
BASOPHILS NFR BLD: 1 % (ref 0–2)
BILIRUB SERPL-MCNC: 0.4 MG/DL (ref 0–1.2)
BILIRUB UR QL STRIP: NEGATIVE
BUN SERPL-MCNC: 11 MG/DL (ref 6–23)
CALCIUM SERPL-MCNC: 9.6 MG/DL (ref 8.6–10.2)
CASTS #/AREA URNS LPF: ABNORMAL /LPF
CHLORIDE SERPL-SCNC: 103 MMOL/L (ref 98–107)
CLARITY UR: CLEAR
CO2 SERPL-SCNC: 23 MMOL/L (ref 22–29)
COLOR UR: YELLOW
CREAT SERPL-MCNC: 0.7 MG/DL (ref 0.5–1)
CRYSTALS URNS MICRO: ABNORMAL /HPF
EOSINOPHIL # BLD: 0.1 K/UL (ref 0.05–0.5)
EOSINOPHILS RELATIVE PERCENT: 1 % (ref 0–6)
ERYTHROCYTE [DISTWIDTH] IN BLOOD BY AUTOMATED COUNT: 12.2 % (ref 11.5–15)
GFR, ESTIMATED: >90 ML/MIN/1.73M2
GLUCOSE SERPL-MCNC: 92 MG/DL (ref 74–99)
GLUCOSE UR STRIP-MCNC: NEGATIVE MG/DL
HCT VFR BLD AUTO: 36.7 % (ref 34–48)
HGB BLD-MCNC: 12 G/DL (ref 11.5–15.5)
HGB UR QL STRIP.AUTO: NEGATIVE
IMM GRANULOCYTES # BLD AUTO: 0.06 K/UL (ref 0–0.58)
IMM GRANULOCYTES NFR BLD: 1 % (ref 0–5)
KETONES UR STRIP-MCNC: NEGATIVE MG/DL
LACTATE BLDV-SCNC: 1.7 MMOL/L (ref 0.5–2.2)
LEUKOCYTE ESTERASE UR QL STRIP: NEGATIVE
LIPASE SERPL-CCNC: 23 U/L (ref 13–60)
LYMPHOCYTES NFR BLD: 2.1 K/UL (ref 1.5–4)
LYMPHOCYTES RELATIVE PERCENT: 23 % (ref 20–42)
MAGNESIUM SERPL-MCNC: 1.8 MG/DL (ref 1.6–2.6)
MCH RBC QN AUTO: 30.3 PG (ref 26–35)
MCHC RBC AUTO-ENTMCNC: 32.7 G/DL (ref 32–34.5)
MCV RBC AUTO: 92.7 FL (ref 80–99.9)
MONOCYTES NFR BLD: 0.64 K/UL (ref 0.1–0.95)
MONOCYTES NFR BLD: 7 % (ref 2–12)
NEUTROPHILS NFR BLD: 68 % (ref 43–80)
NEUTS SEG NFR BLD: 6.39 K/UL (ref 1.8–7.3)
NITRITE UR QL STRIP: NEGATIVE
PH UR STRIP: 6 [PH] (ref 5–9)
PLATELET # BLD AUTO: 278 K/UL (ref 130–450)
PMV BLD AUTO: 10.4 FL (ref 7–12)
POTASSIUM SERPL-SCNC: 3.2 MMOL/L (ref 3.5–5)
PROT SERPL-MCNC: 7.6 G/DL (ref 6.4–8.3)
PROT UR STRIP-MCNC: NEGATIVE MG/DL
RBC # BLD AUTO: 3.96 M/UL (ref 3.5–5.5)
RBC #/AREA URNS HPF: ABNORMAL /HPF
SODIUM SERPL-SCNC: 139 MMOL/L (ref 132–146)
SP GR UR STRIP: 1.01 (ref 1–1.03)
UROBILINOGEN UR STRIP-ACNC: 0.2 EU/DL (ref 0–1)
WBC #/AREA URNS HPF: ABNORMAL /HPF
WBC OTHER # BLD: 9.4 K/UL (ref 4.5–11.5)

## 2025-01-06 PROCEDURE — 80053 COMPREHEN METABOLIC PANEL: CPT

## 2025-01-06 PROCEDURE — 6370000000 HC RX 637 (ALT 250 FOR IP)

## 2025-01-06 PROCEDURE — 83605 ASSAY OF LACTIC ACID: CPT

## 2025-01-06 PROCEDURE — 99285 EMERGENCY DEPT VISIT HI MDM: CPT

## 2025-01-06 PROCEDURE — 6360000004 HC RX CONTRAST MEDICATION: Performed by: RADIOLOGY

## 2025-01-06 PROCEDURE — 74177 CT ABD & PELVIS W/CONTRAST: CPT

## 2025-01-06 PROCEDURE — 83690 ASSAY OF LIPASE: CPT

## 2025-01-06 PROCEDURE — 96361 HYDRATE IV INFUSION ADD-ON: CPT

## 2025-01-06 PROCEDURE — 6360000002 HC RX W HCPCS

## 2025-01-06 PROCEDURE — 85025 COMPLETE CBC W/AUTO DIFF WBC: CPT

## 2025-01-06 PROCEDURE — 2580000003 HC RX 258

## 2025-01-06 PROCEDURE — 81001 URINALYSIS AUTO W/SCOPE: CPT

## 2025-01-06 PROCEDURE — 96374 THER/PROPH/DIAG INJ IV PUSH: CPT

## 2025-01-06 PROCEDURE — 83735 ASSAY OF MAGNESIUM: CPT

## 2025-01-06 PROCEDURE — 96375 TX/PRO/DX INJ NEW DRUG ADDON: CPT

## 2025-01-06 RX ORDER — LOPERAMIDE HYDROCHLORIDE 1 MG/5ML
1 SOLUTION ORAL 4 TIMES DAILY PRN
Qty: 200 ML | Refills: 0 | Status: SHIPPED | OUTPATIENT
Start: 2025-01-06 | End: 2025-01-06

## 2025-01-06 RX ORDER — 0.9 % SODIUM CHLORIDE 0.9 %
1000 INTRAVENOUS SOLUTION INTRAVENOUS ONCE
Status: COMPLETED | OUTPATIENT
Start: 2025-01-06 | End: 2025-01-06

## 2025-01-06 RX ORDER — POTASSIUM CHLORIDE 7.45 MG/ML
10 INJECTION INTRAVENOUS ONCE
Status: COMPLETED | OUTPATIENT
Start: 2025-01-06 | End: 2025-01-06

## 2025-01-06 RX ORDER — ONDANSETRON 2 MG/ML
4 INJECTION INTRAMUSCULAR; INTRAVENOUS ONCE
Status: COMPLETED | OUTPATIENT
Start: 2025-01-06 | End: 2025-01-06

## 2025-01-06 RX ORDER — ONDANSETRON 4 MG/1
4 TABLET, ORALLY DISINTEGRATING ORAL 3 TIMES DAILY PRN
Qty: 8 TABLET | Refills: 0 | Status: SHIPPED | OUTPATIENT
Start: 2025-01-06

## 2025-01-06 RX ORDER — IOPAMIDOL 755 MG/ML
75 INJECTION, SOLUTION INTRAVASCULAR
Status: COMPLETED | OUTPATIENT
Start: 2025-01-06 | End: 2025-01-06

## 2025-01-06 RX ORDER — KETOROLAC TROMETHAMINE 15 MG/ML
15 INJECTION, SOLUTION INTRAMUSCULAR; INTRAVENOUS ONCE
Status: COMPLETED | OUTPATIENT
Start: 2025-01-06 | End: 2025-01-06

## 2025-01-06 RX ORDER — FENTANYL CITRATE 50 UG/ML
25 INJECTION, SOLUTION INTRAMUSCULAR; INTRAVENOUS ONCE
Status: COMPLETED | OUTPATIENT
Start: 2025-01-06 | End: 2025-01-06

## 2025-01-06 RX ADMIN — ONDANSETRON 4 MG: 2 INJECTION, SOLUTION INTRAMUSCULAR; INTRAVENOUS at 09:10

## 2025-01-06 RX ADMIN — SODIUM CHLORIDE 1000 ML: 9 INJECTION, SOLUTION INTRAVENOUS at 15:02

## 2025-01-06 RX ADMIN — POTASSIUM BICARBONATE 40 MEQ: 782 TABLET, EFFERVESCENT ORAL at 14:19

## 2025-01-06 RX ADMIN — KETOROLAC TROMETHAMINE 15 MG: 15 INJECTION, SOLUTION INTRAMUSCULAR; INTRAVENOUS at 14:22

## 2025-01-06 RX ADMIN — POTASSIUM CHLORIDE 10 MEQ: 7.45 INJECTION INTRAVENOUS at 12:09

## 2025-01-06 RX ADMIN — FENTANYL CITRATE 25 MCG: 50 INJECTION INTRAMUSCULAR; INTRAVENOUS at 09:10

## 2025-01-06 RX ADMIN — SODIUM CHLORIDE 1000 ML: 9 INJECTION, SOLUTION INTRAVENOUS at 09:09

## 2025-01-06 RX ADMIN — IOPAMIDOL 75 ML: 755 INJECTION, SOLUTION INTRAVENOUS at 10:44

## 2025-01-06 RX ADMIN — POTASSIUM CHLORIDE 10 MEQ: 7.45 INJECTION INTRAVENOUS at 13:39

## 2025-01-06 ASSESSMENT — PAIN SCALES - GENERAL
PAINLEVEL_OUTOF10: 10
PAINLEVEL_OUTOF10: 5
PAINLEVEL_OUTOF10: 10

## 2025-01-06 ASSESSMENT — PAIN DESCRIPTION - FREQUENCY: FREQUENCY: CONTINUOUS

## 2025-01-06 ASSESSMENT — PAIN DESCRIPTION - DESCRIPTORS
DESCRIPTORS: SHARP;SHOOTING;ACHING
DESCRIPTORS: ACHING;SHOOTING;STABBING;THROBBING
DESCRIPTORS: ACHING;SHOOTING;SHARP

## 2025-01-06 ASSESSMENT — PAIN DESCRIPTION - LOCATION
LOCATION: ABDOMEN

## 2025-01-06 ASSESSMENT — PAIN DESCRIPTION - ONSET: ONSET: ON-GOING

## 2025-01-06 ASSESSMENT — PAIN DESCRIPTION - PAIN TYPE: TYPE: ACUTE PAIN

## 2025-01-06 ASSESSMENT — PAIN - FUNCTIONAL ASSESSMENT
PAIN_FUNCTIONAL_ASSESSMENT: ACTIVITIES ARE NOT PREVENTED
PAIN_FUNCTIONAL_ASSESSMENT: PREVENTS OR INTERFERES SOME ACTIVE ACTIVITIES AND ADLS
PAIN_FUNCTIONAL_ASSESSMENT: 0-10

## 2025-01-06 ASSESSMENT — PAIN DESCRIPTION - ORIENTATION
ORIENTATION: LEFT
ORIENTATION: LOWER;RIGHT;LEFT

## 2025-01-06 ASSESSMENT — LIFESTYLE VARIABLES
HOW MANY STANDARD DRINKS CONTAINING ALCOHOL DO YOU HAVE ON A TYPICAL DAY: PATIENT DOES NOT DRINK
HOW OFTEN DO YOU HAVE A DRINK CONTAINING ALCOHOL: NEVER

## 2025-01-06 NOTE — ED PROVIDER NOTES
mLs IntraVENous Given 1/6/25 1044)   potassium chloride 10 mEq/100 mL IVPB (Peripheral Line) (0 mEq IntraVENous Stopped 1/6/25 1747)   potassium bicarb-citric acid (EFFER-K) effervescent tablet 40 mEq (40 mEq Oral Given 1/6/25 1419)   ketorolac (TORADOL) injection 15 mg (15 mg IntraVENous Given 1/6/25 1422)   sodium chloride 0.9 % bolus 1,000 mL (0 mLs IntraVENous Stopped 1/6/25 1747)       CONSULTS: discussion with bolded \"IP consult\", otherwise consult was likely placed by admitting service  IP CONSULT TO HOSPITALIST    PROCEDURES: Unless otherwise listed below, none    SOCIAL DETERMINANTS: None    ------------------------- ADDITIONAL PROVIDER NOTES ---------------------------    I have spoken to the patient and/or family regarding results and the proposed plan for disposition.  They are comfortable with management and treatment plans as discussed.    -------------------------------- IMPRESSION AND DISPOSITION ---------------------------------    IMPRESSION  1. Nausea vomiting and diarrhea    2. Dehydration    3. Hiatal hernia        DISPOSITION  Disposition: Discharge to home  Patient condition is good    Tank Yip MD PGY-1  1/6/2025  6:13 PM    NOTE: This report was transcribed using voice recognition software. Every effort was made to ensure accuracy; however, inadvertent computerized transcription errors may be present

## 2025-01-06 NOTE — DISCHARGE INSTRUCTIONS
You have been evaluated in the Emergency Department today for abdominal pain, diarrhea and nausea. Your evaluation did not show evidence of medical conditions requiring emergent intervention at this time.    Please schedule an appointment with your primary care physician.    Return to the Emergency Department if you experience worsening or uncontrolled pain, fevers 100.4°F or greater, recurrent vomiting, inability to tolerate food or fluids by mouth, bloody stools or vomit, black or tarry stools, or any other concerning symptoms.    Thank you for choosing us for your care.    Share this:

## 2025-01-07 RX ORDER — BUSPIRONE HYDROCHLORIDE 5 MG/1
5 TABLET ORAL 2 TIMES DAILY
Qty: 60 TABLET | Refills: 0 | Status: SHIPPED | OUTPATIENT
Start: 2025-01-07

## 2025-01-08 ENCOUNTER — TELEPHONE (OUTPATIENT)
Age: 70
End: 2025-01-08

## 2025-01-08 DIAGNOSIS — K51.00 ULCERATIVE PANCOLITIS WITHOUT COMPLICATION (HCC): Primary | ICD-10-CM

## 2025-01-09 DIAGNOSIS — K51.00 ULCERATIVE PANCOLITIS WITHOUT COMPLICATION (HCC): ICD-10-CM

## 2025-01-09 LAB — C-REACTIVE PROTEIN: 16 MG/L (ref 0–5)

## 2025-01-10 LAB — SED RATE, AUTOMATED: 63 MM/HR (ref 0–20)

## 2025-01-10 RX ORDER — PREDNISONE 10 MG/1
TABLET ORAL
Qty: 100 TABLET | Refills: 0 | Status: SHIPPED | OUTPATIENT
Start: 2025-01-10 | End: 2025-02-18

## 2025-01-13 LAB
CULTURE: NORMAL
CULTURE: NORMAL
SPECIMEN DESCRIPTION: NORMAL

## 2025-01-16 LAB — CALPROTECTIN, FECAL: 974 UG/G

## 2025-01-27 SDOH — HEALTH STABILITY: PHYSICAL HEALTH: ON AVERAGE, HOW MANY DAYS PER WEEK DO YOU ENGAGE IN MODERATE TO STRENUOUS EXERCISE (LIKE A BRISK WALK)?: 3 DAYS

## 2025-01-27 SDOH — HEALTH STABILITY: PHYSICAL HEALTH: ON AVERAGE, HOW MANY MINUTES DO YOU ENGAGE IN EXERCISE AT THIS LEVEL?: 30 MIN

## 2025-01-28 ENCOUNTER — OFFICE VISIT (OUTPATIENT)
Dept: PRIMARY CARE CLINIC | Age: 70
End: 2025-01-28

## 2025-01-28 VITALS
WEIGHT: 132 LBS | HEIGHT: 62 IN | SYSTOLIC BLOOD PRESSURE: 110 MMHG | HEART RATE: 64 BPM | DIASTOLIC BLOOD PRESSURE: 80 MMHG | OXYGEN SATURATION: 97 % | TEMPERATURE: 97.3 F | BODY MASS INDEX: 24.29 KG/M2

## 2025-01-28 DIAGNOSIS — R47.01 EXPRESSIVE APHASIA: ICD-10-CM

## 2025-01-28 DIAGNOSIS — F80.2: ICD-10-CM

## 2025-01-28 DIAGNOSIS — R41.89 PSEUDODEMENTIA: ICD-10-CM

## 2025-01-28 DIAGNOSIS — F41.1 GENERALIZED ANXIETY DISORDER: Primary | ICD-10-CM

## 2025-01-28 DIAGNOSIS — K51.90 MODERATE CHRONIC ULCERATIVE COLITIS WITHOUT COMPLICATION (HCC): ICD-10-CM

## 2025-01-28 PROBLEM — K43.9 VENTRAL HERNIA WITHOUT OBSTRUCTION OR GANGRENE: Status: RESOLVED | Noted: 2019-10-29 | Resolved: 2025-01-28

## 2025-01-28 PROBLEM — L65.9 ALOPECIA: Status: RESOLVED | Noted: 2019-08-27 | Resolved: 2025-01-28

## 2025-01-28 PROBLEM — E78.5 HYPERLIPIDEMIA: Status: RESOLVED | Noted: 2021-01-07 | Resolved: 2025-01-28

## 2025-01-28 PROBLEM — F41.9 ANXIETY AND DEPRESSION: Status: RESOLVED | Noted: 2023-01-24 | Resolved: 2025-01-28

## 2025-01-28 PROBLEM — F32.A ANXIETY AND DEPRESSION: Status: RESOLVED | Noted: 2023-01-24 | Resolved: 2025-01-28

## 2025-01-28 RX ORDER — DESVENLAFAXINE 25 MG/1
25 TABLET, EXTENDED RELEASE ORAL NIGHTLY
Qty: 30 TABLET | Refills: 1 | Status: SHIPPED | OUTPATIENT
Start: 2025-01-28

## 2025-01-28 RX ORDER — MESALAMINE 1.2 G/1
1200 TABLET, DELAYED RELEASE ORAL
Qty: 90 TABLET | Refills: 3 | Status: SHIPPED | OUTPATIENT
Start: 2025-01-28

## 2025-01-28 RX ORDER — HYDROXYZINE PAMOATE 25 MG/1
25 CAPSULE ORAL 3 TIMES DAILY PRN
Qty: 90 CAPSULE | Refills: 0 | Status: SHIPPED | OUTPATIENT
Start: 2025-01-28 | End: 2025-02-27

## 2025-01-28 RX ORDER — LORAZEPAM 0.5 MG/1
0.5 TABLET ORAL EVERY 6 HOURS PRN
COMMUNITY
End: 2025-01-28

## 2025-01-28 SDOH — ECONOMIC STABILITY: FOOD INSECURITY: WITHIN THE PAST 12 MONTHS, THE FOOD YOU BOUGHT JUST DIDN'T LAST AND YOU DIDN'T HAVE MONEY TO GET MORE.: NEVER TRUE

## 2025-01-28 SDOH — ECONOMIC STABILITY: FOOD INSECURITY: WITHIN THE PAST 12 MONTHS, YOU WORRIED THAT YOUR FOOD WOULD RUN OUT BEFORE YOU GOT MONEY TO BUY MORE.: NEVER TRUE

## 2025-01-28 ASSESSMENT — ENCOUNTER SYMPTOMS
COUGH: 0
SHORTNESS OF BREATH: 0
DIARRHEA: 0
ABDOMINAL PAIN: 1
VOMITING: 0
CONSTIPATION: 0
WHEEZING: 0
BACK PAIN: 0
NAUSEA: 0

## 2025-01-28 ASSESSMENT — PATIENT HEALTH QUESTIONNAIRE - PHQ9
10. IF YOU CHECKED OFF ANY PROBLEMS, HOW DIFFICULT HAVE THESE PROBLEMS MADE IT FOR YOU TO DO YOUR WORK, TAKE CARE OF THINGS AT HOME, OR GET ALONG WITH OTHER PEOPLE: NOT DIFFICULT AT ALL
SUM OF ALL RESPONSES TO PHQ QUESTIONS 1-9: 2
7. TROUBLE CONCENTRATING ON THINGS, SUCH AS READING THE NEWSPAPER OR WATCHING TELEVISION: SEVERAL DAYS
SUM OF ALL RESPONSES TO PHQ9 QUESTIONS 1 & 2: 0
2. FEELING DOWN, DEPRESSED OR HOPELESS: NOT AT ALL
6. FEELING BAD ABOUT YOURSELF - OR THAT YOU ARE A FAILURE OR HAVE LET YOURSELF OR YOUR FAMILY DOWN: NOT AT ALL
4. FEELING TIRED OR HAVING LITTLE ENERGY: NOT AT ALL
8. MOVING OR SPEAKING SO SLOWLY THAT OTHER PEOPLE COULD HAVE NOTICED. OR THE OPPOSITE, BEING SO FIGETY OR RESTLESS THAT YOU HAVE BEEN MOVING AROUND A LOT MORE THAN USUAL: NOT AT ALL
SUM OF ALL RESPONSES TO PHQ QUESTIONS 1-9: 2
5. POOR APPETITE OR OVEREATING: NOT AT ALL
SUM OF ALL RESPONSES TO PHQ QUESTIONS 1-9: 2
3. TROUBLE FALLING OR STAYING ASLEEP: SEVERAL DAYS
9. THOUGHTS THAT YOU WOULD BE BETTER OFF DEAD, OR OF HURTING YOURSELF: NOT AT ALL
1. LITTLE INTEREST OR PLEASURE IN DOING THINGS: NOT AT ALL
SUM OF ALL RESPONSES TO PHQ QUESTIONS 1-9: 2

## 2025-01-28 NOTE — ASSESSMENT & PLAN NOTE
Question if a large component of her symptoms are due to significant, uncontrolled anxiety.  Will work to find more appropriate medication to help control this.  Stop Effexor, start Pristiq.  Stop Buspar, start Hydroxyzine PRN.     Orders:    desvenlafaxine succinate (PRISTIQ) 25 MG TB24 extended release tablet; Take 1 tablet by mouth at bedtime    hydrOXYzine pamoate (VISTARIL) 25 MG capsule; Take 1 capsule by mouth 3 times daily as needed for Anxiety

## 2025-01-28 NOTE — ASSESSMENT & PLAN NOTE
Patient is able to recall many past and present events.  Doubt true dementia at this time.

## 2025-01-28 NOTE — PROGRESS NOTES
25  Lorrie Burns : 1955 Sex: female  Age: 69 y.o.    Chief Complaint   Patient presents with    Established New Doctor    Aphasia      states it is progressively getting worse - went to hospital for this about 2 years ago this week. Speech therapy d/c her and they feel its better when she is in the therapy      HPI:  69 y.o. female presents today to establish care with a new PCP.  Previously seen by Dr. Golden.  Patient's chart, medical, surgical and medication history all reviewed.    Dementia  History determined based on chart documentation, 's recollection.  According to chart records, patient underwent COVID19 vaccination in 2021 and developed significant neurologic issues.  She has expressive aphasia, difficulty with word finding  and difficulty with reading and writing.  Has been seen by Neuro and has had speech therapy, multiple scans, numerous types of blood work, etc.   states that condition worsened after her hospitalization in 2023 for UC flare up.  He states that she is up 3-4 times at night to utilize the restroom, her hair is falling out, increasing GERD symptoms, dizzy with change in position, and pain under rib cage.  She has had several concussions in her lifetime where she was \"knocked out\".  Document from  suggests that patient remembers one at 2-3 yo from a polly totter, 7-9 yo from a swing, 35 yo with syncopal even at work, and 45 yo with syncopal event.  She has been seen by Dr. Meek who reviewed everything and did not feel there was any acute neurologic changes on scans.  He was more concerned about a possible psuedodementia related to severe anxiety.   He started her on low dose Effexor and referred to to ST.   Patient's  states that when they went to the [facility] for therapy, they had a poor experience and would like to go elsewhere.     Anxiety  Patient complains of evaluation of anxiety disorder.  She has the

## 2025-01-28 NOTE — ASSESSMENT & PLAN NOTE
Monitored by specialist- no acute findings meriting change in the plan    Orders:    mesalamine (LIALDA) 1.2 g EC tablet; Take 1 tablet by mouth daily (with breakfast)

## 2025-01-30 DIAGNOSIS — R10.12 LEFT UPPER QUADRANT ABDOMINAL PAIN: Primary | ICD-10-CM

## 2025-02-04 ENCOUNTER — OFFICE VISIT (OUTPATIENT)
Dept: GASTROENTEROLOGY | Age: 70
End: 2025-02-04
Payer: MEDICARE

## 2025-02-04 ENCOUNTER — PATIENT MESSAGE (OUTPATIENT)
Dept: GASTROENTEROLOGY | Age: 70
End: 2025-02-04

## 2025-02-04 ENCOUNTER — PREP FOR PROCEDURE (OUTPATIENT)
Dept: GASTROENTEROLOGY | Age: 70
End: 2025-02-04

## 2025-02-04 VITALS
BODY MASS INDEX: 23.92 KG/M2 | RESPIRATION RATE: 16 BRPM | WEIGHT: 130 LBS | TEMPERATURE: 97 F | HEART RATE: 100 BPM | HEIGHT: 62 IN | SYSTOLIC BLOOD PRESSURE: 124 MMHG | OXYGEN SATURATION: 98 % | DIASTOLIC BLOOD PRESSURE: 70 MMHG

## 2025-02-04 DIAGNOSIS — R10.12 LEFT UPPER QUADRANT ABDOMINAL PAIN: ICD-10-CM

## 2025-02-04 DIAGNOSIS — K51.90 MODERATE CHRONIC ULCERATIVE COLITIS WITHOUT COMPLICATION (HCC): ICD-10-CM

## 2025-02-04 DIAGNOSIS — K51.80 OTHER ULCERATIVE COLITIS WITHOUT COMPLICATION (HCC): Primary | ICD-10-CM

## 2025-02-04 PROCEDURE — 1159F MED LIST DOCD IN RCRD: CPT | Performed by: NURSE PRACTITIONER

## 2025-02-04 PROCEDURE — 99212 OFFICE O/P EST SF 10 MIN: CPT | Performed by: NURSE PRACTITIONER

## 2025-02-04 PROCEDURE — 1123F ACP DISCUSS/DSCN MKR DOCD: CPT | Performed by: NURSE PRACTITIONER

## 2025-02-04 RX ORDER — PREDNISONE 10 MG/1
TABLET ORAL
Qty: 100 TABLET | Refills: 0 | Status: SHIPPED | OUTPATIENT
Start: 2025-02-04 | End: 2025-03-15

## 2025-02-04 RX ORDER — MESALAMINE 1.2 G/1
4800 TABLET, DELAYED RELEASE ORAL
Qty: 120 TABLET | Refills: 5 | Status: SHIPPED | OUTPATIENT
Start: 2025-02-04

## 2025-02-04 NOTE — PROGRESS NOTES
Lorrie Burns (:  1955) is a 69 y.o. female, here for evaluation of the following chief complaint(s):  Follow-up (Hospital follow up patient has had diarrhea 5 days straight she goes every 3 hours or after she eats at hospital they gave her bentyl and zofran and then she was given prednisone she is half way thru regimen )      SUBJECTIVE/OBJECTIVE:  HPI:     Lorrie is a very pleasant 69 year old female with a history of ulcerative colitis    Patient was scheduled for a colonoscopy but had to cancel because she was ill ( diarrhea and abdominal pain)  She is currently taking Mesalamine 1 tab daily    Symptoms of diarrhea and abdominal pain continued.  Lab work and stool testing ordered.   Lab work at that time showed elevated inflammatory markers and an elevated stool calprotectin.  Started on a prednisone taper  When she was on 40 mg of prednisone, the above symptoms were intermittent.  Now that she is down to 15 mg , the symptoms are daily    Having BM after meals.  Describes it as diarrhea  Patient is taking Mesalamine 1.2 grams daily    Colonoscopy -   Normal terminal ileum.  Normal ascending colon. Mild diverticulosis. Biopsied.  Transverse colon with scarring. No active inflammation. Mild diverticulosis. Biopsied.  Normal descending colon and rectum. Biopsied.  Small hemorrhoids.     Diagnosis --  A. Right colon, colonoscopic biopsy: No diagnostic abnormality.  B. Transverse colon, colonoscopic biopsy: No diagnostic abnormality.  C. Left colon, colonoscopic biopsy: No diagnostic abnormality.  D. Rectum, colonoscopic biopsy: No diagnostic abnormality.           ROS:  General: Patient denies n/v/f/c or weight loss.  HEENT: Patient denies persistent postnasal drip, scleral icterus, drooling, persistent bleeding from nose/mouth.  Resp: Patient denies SOB, wheezing, productive cough.  Cards: Patient denies CP, palpitations, significant edema  GI: As above.  Derm: Patient denies

## 2025-02-05 RX ORDER — BUSPIRONE HYDROCHLORIDE 5 MG/1
5 TABLET ORAL 2 TIMES DAILY
Qty: 60 TABLET | Refills: 0 | OUTPATIENT
Start: 2025-02-05

## 2025-02-10 ENCOUNTER — HOSPITAL ENCOUNTER (OUTPATIENT)
Dept: GENERAL RADIOLOGY | Age: 70
Discharge: HOME | End: 2025-02-12

## 2025-02-10 DIAGNOSIS — R10.12 LEFT UPPER QUADRANT ABDOMINAL PAIN: ICD-10-CM

## 2025-02-11 RX ORDER — PREDNISONE 10 MG/1
TABLET ORAL
Qty: 100 TABLET | Refills: 0 | OUTPATIENT
Start: 2025-02-11

## 2025-02-12 NOTE — PROGRESS NOTES
[facility] PRE-ADMISSION TESTING   ENDOSCOPY AND COLONOSCOPY INSTRUCTIONS  PAT- Phone Number: [phone]    ENDOSCOPY AND COLONOSCOPY INSTRUCTIONS:     [x] Bowel Prep instructions reviewed - any questions regarding your prep, please contact surgeon's office.  [x] Colonoscopy: 1-2 days prior: No solid foods. Clear liquids only - nothing red or purple in color.  [x] Antibacterial Soap Shower Night before AND the morning of procedure.  [x] No solid food after midnight. You may have SIPS of clear liquids up until 2 hours before your arrival time to the hospital.   [x] Do not wear makeup, lotions, powders, deodorant.   [x] No tobacco products, illegal drugs, or alcohol within 24 hours of your surgery.  [x] Jewelry or valuables should not be brought to the hospital. All body and/or tongue piercing's must be removed prior to arriving to hospital. No contact lens or hair pins.   [] Urine Pregnancy test will be preformed the day of surgery. A specimen sample may be brought from home.  [x] Arrange transportation with a responsible adult  to and from the hospital. If you do not have a responsible adult  to transport you, you will need to make arrangements with a medical transportation company. Arrange for someone to be with you for the remainder of the day and for 24 hours after your procedure due to having had anesthesia.    -Who will be your  for transportation?   -Who will be staying with you for 24 hrs after your procedure?   [x] Bring insurance card and photo ID.  [x] Bring copy of living will or healthcare power of  papers to be placed in your electronic record.    PARKING INSTRUCTIONS:     [x] ARRIVAL DATE & TIME: 2/18 @ 0745  [x] Times are subject to change. We will contact you the business day before surgery if that were to occur.  [x] Enter into the [facility] Entrance. Two people may accompany you. Masks are not required.  [x] Parking Lot

## 2025-02-13 ENCOUNTER — HOSPITAL ENCOUNTER (OUTPATIENT)
Dept: GENERAL RADIOLOGY | Age: 70
Discharge: HOME | End: 2025-02-15
Payer: MEDICARE

## 2025-02-13 DIAGNOSIS — R10.12 ABDOMINAL PAIN, LEFT UPPER QUADRANT: ICD-10-CM

## 2025-02-13 PROCEDURE — 74240 X-RAY XM UPR GI TRC 1CNTRST: CPT

## 2025-02-13 PROCEDURE — 6370000000 HC RX 637 (ALT 250 FOR IP): Performed by: RADIOLOGY

## 2025-02-13 PROCEDURE — 2500000003 HC RX 250 WO HCPCS: Performed by: RADIOLOGY

## 2025-02-13 RX ADMIN — BARIUM SULFATE 176 ML: 960 POWDER, FOR SUSPENSION ORAL at 10:10

## 2025-02-13 RX ADMIN — BARIUM SULFATE 140 ML: 980 POWDER, FOR SUSPENSION ORAL at 10:12

## 2025-02-13 RX ADMIN — ANTACID/ANTIFLATULENT 1 EACH: 380; 550; 10; 10 GRANULE, EFFERVESCENT ORAL at 10:13

## 2025-02-18 ENCOUNTER — HOSPITAL ENCOUNTER (OUTPATIENT)
Age: 70
Setting detail: OBSERVATION
Discharge: HOME | End: 2025-02-21
Attending: STUDENT IN AN ORGANIZED HEALTH CARE EDUCATION/TRAINING PROGRAM | Admitting: STUDENT IN AN ORGANIZED HEALTH CARE EDUCATION/TRAINING PROGRAM
Payer: MEDICARE

## 2025-02-18 ENCOUNTER — ANESTHESIA (OUTPATIENT)
Dept: ENDOSCOPY | Age: 70
End: 2025-02-18
Payer: MEDICARE

## 2025-02-18 ENCOUNTER — ANESTHESIA EVENT (OUTPATIENT)
Dept: ENDOSCOPY | Age: 70
End: 2025-02-18
Payer: MEDICARE

## 2025-02-18 DIAGNOSIS — K51.90 MODERATE CHRONIC ULCERATIVE COLITIS WITHOUT COMPLICATION (HCC): ICD-10-CM

## 2025-02-18 DIAGNOSIS — R55 SYNCOPE, UNSPECIFIED SYNCOPE TYPE: Primary | ICD-10-CM

## 2025-02-18 PROBLEM — I49.9 IRREGULAR HEARTBEAT: Status: ACTIVE | Noted: 2025-02-18

## 2025-02-18 LAB
ALBUMIN SERPL-MCNC: 3.2 G/DL (ref 3.5–5.2)
ALP SERPL-CCNC: 52 U/L (ref 35–104)
ALT SERPL-CCNC: 19 U/L (ref 0–32)
ANION GAP SERPL CALCULATED.3IONS-SCNC: 9 MMOL/L (ref 7–16)
AST SERPL-CCNC: 18 U/L (ref 0–31)
BILIRUB SERPL-MCNC: 0.4 MG/DL (ref 0–1.2)
BUN SERPL-MCNC: 14 MG/DL (ref 6–23)
CALCIUM SERPL-MCNC: 7.8 MG/DL (ref 8.6–10.2)
CHLORIDE SERPL-SCNC: 107 MMOL/L (ref 98–107)
CO2 SERPL-SCNC: 22 MMOL/L (ref 22–29)
CREAT SERPL-MCNC: 0.6 MG/DL (ref 0.5–1)
ERYTHROCYTE [DISTWIDTH] IN BLOOD BY AUTOMATED COUNT: 13.4 % (ref 11.5–15)
GFR, ESTIMATED: >90 ML/MIN/1.73M2
GLUCOSE SERPL-MCNC: 85 MG/DL (ref 74–99)
HCT VFR BLD AUTO: 40.2 % (ref 34–48)
HGB BLD-MCNC: 12.8 G/DL (ref 11.5–15.5)
MAGNESIUM SERPL-MCNC: 1.9 MG/DL (ref 1.6–2.6)
MCH RBC QN AUTO: 30.1 PG (ref 26–35)
MCHC RBC AUTO-ENTMCNC: 31.8 G/DL (ref 32–34.5)
MCV RBC AUTO: 94.6 FL (ref 80–99.9)
PLATELET # BLD AUTO: 244 K/UL (ref 130–450)
PMV BLD AUTO: 9.6 FL (ref 7–12)
POTASSIUM SERPL-SCNC: 4.2 MMOL/L (ref 3.5–5)
PROT SERPL-MCNC: 5.7 G/DL (ref 6.4–8.3)
RBC # BLD AUTO: 4.25 M/UL (ref 3.5–5.5)
SODIUM SERPL-SCNC: 138 MMOL/L (ref 132–146)
TSH SERPL DL<=0.05 MIU/L-ACNC: 3.15 UIU/ML (ref 0.27–4.2)
WBC OTHER # BLD: 9.8 K/UL (ref 4.5–11.5)

## 2025-02-18 PROCEDURE — 6360000002 HC RX W HCPCS: Performed by: INTERNAL MEDICINE

## 2025-02-18 PROCEDURE — 2580000003 HC RX 258: Performed by: INTERNAL MEDICINE

## 2025-02-18 PROCEDURE — 3700000001 HC ADD 15 MINUTES (ANESTHESIA): Performed by: STUDENT IN AN ORGANIZED HEALTH CARE EDUCATION/TRAINING PROGRAM

## 2025-02-18 PROCEDURE — 96372 THER/PROPH/DIAG INJ SC/IM: CPT

## 2025-02-18 PROCEDURE — 2500000003 HC RX 250 WO HCPCS

## 2025-02-18 PROCEDURE — 93005 ELECTROCARDIOGRAM TRACING: CPT | Performed by: STUDENT IN AN ORGANIZED HEALTH CARE EDUCATION/TRAINING PROGRAM

## 2025-02-18 PROCEDURE — 7100000001 HC PACU RECOVERY - ADDTL 15 MIN: Performed by: STUDENT IN AN ORGANIZED HEALTH CARE EDUCATION/TRAINING PROGRAM

## 2025-02-18 PROCEDURE — G0378 HOSPITAL OBSERVATION PER HR: HCPCS

## 2025-02-18 PROCEDURE — 3609017100 HC EGD: Performed by: STUDENT IN AN ORGANIZED HEALTH CARE EDUCATION/TRAINING PROGRAM

## 2025-02-18 PROCEDURE — 84443 ASSAY THYROID STIM HORMONE: CPT

## 2025-02-18 PROCEDURE — APPSS60 APP SPLIT SHARED TIME 46-60 MINUTES: Performed by: CLINICAL NURSE SPECIALIST

## 2025-02-18 PROCEDURE — 3700000000 HC ANESTHESIA ATTENDED CARE: Performed by: STUDENT IN AN ORGANIZED HEALTH CARE EDUCATION/TRAINING PROGRAM

## 2025-02-18 PROCEDURE — 6360000002 HC RX W HCPCS

## 2025-02-18 PROCEDURE — 85027 COMPLETE CBC AUTOMATED: CPT

## 2025-02-18 PROCEDURE — 2500000003 HC RX 250 WO HCPCS: Performed by: INTERNAL MEDICINE

## 2025-02-18 PROCEDURE — 2709999900 HC NON-CHARGEABLE SUPPLY: Performed by: STUDENT IN AN ORGANIZED HEALTH CARE EDUCATION/TRAINING PROGRAM

## 2025-02-18 PROCEDURE — 2580000003 HC RX 258

## 2025-02-18 PROCEDURE — 6360000002 HC RX W HCPCS: Performed by: STUDENT IN AN ORGANIZED HEALTH CARE EDUCATION/TRAINING PROGRAM

## 2025-02-18 PROCEDURE — 3609027000 HC COLONOSCOPY: Performed by: STUDENT IN AN ORGANIZED HEALTH CARE EDUCATION/TRAINING PROGRAM

## 2025-02-18 PROCEDURE — 80053 COMPREHEN METABOLIC PANEL: CPT

## 2025-02-18 PROCEDURE — 7100000000 HC PACU RECOVERY - FIRST 15 MIN: Performed by: STUDENT IN AN ORGANIZED HEALTH CARE EDUCATION/TRAINING PROGRAM

## 2025-02-18 PROCEDURE — 83735 ASSAY OF MAGNESIUM: CPT

## 2025-02-18 PROCEDURE — 2500000003 HC RX 250 WO HCPCS: Performed by: STUDENT IN AN ORGANIZED HEALTH CARE EDUCATION/TRAINING PROGRAM

## 2025-02-18 RX ORDER — CALCIUM POLYCARBOPHIL 625 MG
625 TABLET ORAL 2 TIMES DAILY
Qty: 60 TABLET | Refills: 11 | Status: SHIPPED | OUTPATIENT
Start: 2025-02-18

## 2025-02-18 RX ORDER — 0.9 % SODIUM CHLORIDE 0.9 %
500 INTRAVENOUS SOLUTION INTRAVENOUS ONCE
Status: COMPLETED | OUTPATIENT
Start: 2025-02-18 | End: 2025-02-18

## 2025-02-18 RX ORDER — MAGNESIUM SULFATE 1 G/100ML
1000 INJECTION INTRAVENOUS ONCE
Status: COMPLETED | OUTPATIENT
Start: 2025-02-18 | End: 2025-02-18

## 2025-02-18 RX ORDER — MESALAMINE 1.2 G/1
4800 TABLET, DELAYED RELEASE ORAL
Qty: 120 TABLET | Refills: 11 | Status: SHIPPED | OUTPATIENT
Start: 2025-02-18

## 2025-02-18 RX ORDER — ONDANSETRON 4 MG/1
4 TABLET, ORALLY DISINTEGRATING ORAL EVERY 8 HOURS PRN
Status: DISCONTINUED | OUTPATIENT
Start: 2025-02-18 | End: 2025-02-18

## 2025-02-18 RX ORDER — METOPROLOL TARTRATE 1 MG/ML
5 INJECTION, SOLUTION INTRAVENOUS ONCE
Status: COMPLETED | OUTPATIENT
Start: 2025-02-18 | End: 2025-02-18

## 2025-02-18 RX ORDER — ACETAMINOPHEN 325 MG/1
650 TABLET ORAL EVERY 6 HOURS PRN
Status: DISCONTINUED | OUTPATIENT
Start: 2025-02-18 | End: 2025-02-21 | Stop reason: HOSPADM

## 2025-02-18 RX ORDER — PROCHLORPERAZINE MALEATE 10 MG
10 TABLET ORAL EVERY 8 HOURS PRN
Status: DISCONTINUED | OUTPATIENT
Start: 2025-02-18 | End: 2025-02-21 | Stop reason: HOSPADM

## 2025-02-18 RX ORDER — SODIUM CHLORIDE 9 MG/ML
INJECTION, SOLUTION INTRAVENOUS PRN
Status: DISCONTINUED | OUTPATIENT
Start: 2025-02-18 | End: 2025-02-21 | Stop reason: HOSPADM

## 2025-02-18 RX ORDER — POTASSIUM CHLORIDE 7.45 MG/ML
10 INJECTION INTRAVENOUS PRN
Status: DISCONTINUED | OUTPATIENT
Start: 2025-02-18 | End: 2025-02-21 | Stop reason: HOSPADM

## 2025-02-18 RX ORDER — SODIUM CHLORIDE 0.9 % (FLUSH) 0.9 %
5-40 SYRINGE (ML) INJECTION EVERY 12 HOURS SCHEDULED
Status: DISCONTINUED | OUTPATIENT
Start: 2025-02-18 | End: 2025-02-21 | Stop reason: HOSPADM

## 2025-02-18 RX ORDER — FENTANYL CITRATE 50 UG/ML
INJECTION, SOLUTION INTRAMUSCULAR; INTRAVENOUS
Status: DISCONTINUED | OUTPATIENT
Start: 2025-02-18 | End: 2025-02-18 | Stop reason: SDUPTHER

## 2025-02-18 RX ORDER — PROPOFOL 10 MG/ML
INJECTION, EMULSION INTRAVENOUS
Status: DISCONTINUED | OUTPATIENT
Start: 2025-02-18 | End: 2025-02-18 | Stop reason: SDUPTHER

## 2025-02-18 RX ORDER — ONDANSETRON 2 MG/ML
4 INJECTION INTRAMUSCULAR; INTRAVENOUS EVERY 6 HOURS PRN
Status: DISCONTINUED | OUTPATIENT
Start: 2025-02-18 | End: 2025-02-18

## 2025-02-18 RX ORDER — ACETAMINOPHEN 650 MG/1
650 SUPPOSITORY RECTAL EVERY 6 HOURS PRN
Status: DISCONTINUED | OUTPATIENT
Start: 2025-02-18 | End: 2025-02-21 | Stop reason: HOSPADM

## 2025-02-18 RX ORDER — PROCHLORPERAZINE EDISYLATE 5 MG/ML
10 INJECTION INTRAMUSCULAR; INTRAVENOUS EVERY 6 HOURS PRN
Status: DISCONTINUED | OUTPATIENT
Start: 2025-02-18 | End: 2025-02-21 | Stop reason: HOSPADM

## 2025-02-18 RX ORDER — POTASSIUM CHLORIDE 1500 MG/1
40 TABLET, EXTENDED RELEASE ORAL PRN
Status: DISCONTINUED | OUTPATIENT
Start: 2025-02-18 | End: 2025-02-21 | Stop reason: HOSPADM

## 2025-02-18 RX ORDER — ONDANSETRON 2 MG/ML
INJECTION INTRAMUSCULAR; INTRAVENOUS
Status: DISCONTINUED | OUTPATIENT
Start: 2025-02-18 | End: 2025-02-18 | Stop reason: SDUPTHER

## 2025-02-18 RX ORDER — SODIUM CHLORIDE 9 MG/ML
INJECTION, SOLUTION INTRAVENOUS CONTINUOUS
Status: ACTIVE | OUTPATIENT
Start: 2025-02-18 | End: 2025-02-19

## 2025-02-18 RX ORDER — COLESEVELAM 180 1/1
625 TABLET ORAL 2 TIMES DAILY WITH MEALS
Qty: 60 TABLET | Refills: 11 | Status: SHIPPED | OUTPATIENT
Start: 2025-02-18

## 2025-02-18 RX ORDER — POLYETHYLENE GLYCOL 3350 17 G/17G
17 POWDER, FOR SOLUTION ORAL DAILY PRN
Status: DISCONTINUED | OUTPATIENT
Start: 2025-02-18 | End: 2025-02-21 | Stop reason: HOSPADM

## 2025-02-18 RX ORDER — SODIUM CHLORIDE 0.9 % (FLUSH) 0.9 %
5-40 SYRINGE (ML) INJECTION PRN
Status: DISCONTINUED | OUTPATIENT
Start: 2025-02-18 | End: 2025-02-21 | Stop reason: HOSPADM

## 2025-02-18 RX ORDER — ENOXAPARIN SODIUM 100 MG/ML
40 INJECTION SUBCUTANEOUS DAILY
Status: DISCONTINUED | OUTPATIENT
Start: 2025-02-18 | End: 2025-02-21 | Stop reason: HOSPADM

## 2025-02-18 RX ORDER — SODIUM CHLORIDE 9 MG/ML
INJECTION, SOLUTION INTRAVENOUS
Status: DISCONTINUED | OUTPATIENT
Start: 2025-02-18 | End: 2025-02-18 | Stop reason: SDUPTHER

## 2025-02-18 RX ORDER — METOPROLOL TARTRATE 1 MG/ML
INJECTION, SOLUTION INTRAVENOUS
Status: DISCONTINUED | OUTPATIENT
Start: 2025-02-18 | End: 2025-02-18 | Stop reason: SDUPTHER

## 2025-02-18 RX ORDER — MAGNESIUM SULFATE IN WATER 40 MG/ML
2000 INJECTION, SOLUTION INTRAVENOUS PRN
Status: DISCONTINUED | OUTPATIENT
Start: 2025-02-18 | End: 2025-02-21 | Stop reason: HOSPADM

## 2025-02-18 RX ADMIN — SODIUM CHLORIDE 500 ML: 0.9 INJECTION, SOLUTION INTRAVENOUS at 19:00

## 2025-02-18 RX ADMIN — SODIUM CHLORIDE, PRESERVATIVE FREE 10 ML: 5 INJECTION INTRAVENOUS at 21:34

## 2025-02-18 RX ADMIN — SODIUM CHLORIDE: 9 INJECTION, SOLUTION INTRAVENOUS at 09:56

## 2025-02-18 RX ADMIN — ONDANSETRON 4 MG: 2 INJECTION, SOLUTION INTRAMUSCULAR; INTRAVENOUS at 10:08

## 2025-02-18 RX ADMIN — SODIUM CHLORIDE: 0.9 INJECTION, SOLUTION INTRAVENOUS at 21:34

## 2025-02-18 RX ADMIN — SODIUM CHLORIDE: 9 INJECTION, SOLUTION INTRAVENOUS at 10:36

## 2025-02-18 RX ADMIN — METOPROLOL TARTRATE 1 MG: 5 INJECTION INTRAVENOUS at 10:01

## 2025-02-18 RX ADMIN — METOPROLOL TARTRATE 5 MG: 5 INJECTION INTRAVENOUS at 23:21

## 2025-02-18 RX ADMIN — ENOXAPARIN SODIUM 40 MG: 100 INJECTION SUBCUTANEOUS at 19:00

## 2025-02-18 RX ADMIN — FENTANYL CITRATE 50 MCG: 50 INJECTION, SOLUTION INTRAMUSCULAR; INTRAVENOUS at 10:10

## 2025-02-18 RX ADMIN — METOPROLOL TARTRATE 1 MG: 5 INJECTION INTRAVENOUS at 10:00

## 2025-02-18 RX ADMIN — METOPROLOL TARTRATE 1 MG: 5 INJECTION INTRAVENOUS at 10:03

## 2025-02-18 RX ADMIN — MAGNESIUM SULFATE HEPTAHYDRATE 1000 MG: 1 INJECTION, SOLUTION INTRAVENOUS at 17:22

## 2025-02-18 RX ADMIN — METOPROLOL TARTRATE 1 MG: 5 INJECTION INTRAVENOUS at 09:56

## 2025-02-18 RX ADMIN — PROPOFOL 240 MG: 10 INJECTION, EMULSION INTRAVENOUS at 10:04

## 2025-02-18 ASSESSMENT — PAIN - FUNCTIONAL ASSESSMENT: PAIN_FUNCTIONAL_ASSESSMENT: 0-10

## 2025-02-18 ASSESSMENT — PAIN SCALES - GENERAL: PAINLEVEL_OUTOF10: 0

## 2025-02-18 NOTE — PROGRESS NOTES
Spoke with Dr. Ennis about patient's high heart rate. He will speak with Dr. Layton. Awaiting further orders.

## 2025-02-18 NOTE — H&P
[facility]   Gastroenterology, Hepatology, &  Advanced Endoscopy    Consult       ASSESSMENT AND PLAN:    69y/F w/ Ulcerative Colitis who presents for endoscopic evaluation of abdominal pain and restaging of disease.     PLAN:  EGD/Colonoscopy        HISTORY OF PRESENT ILLNESS:      Ms. Lorrie Burns is a 69y/F w/ Ulcerative Colitis who presents for endoscopic evaluation of abdominal pain and restaging of disease.     Past Medical History:        Diagnosis Date    C. difficile colitis 2023    Encounter for screening colonoscopy     Hyperlipidemia     Hypertension     Memory loss     patient aphasic    Migraines     Vitamin D deficiency 11/11/2016     Past Surgical History:        Procedure Laterality Date    BREAST BIOPSY      BREAST REDUCTION SURGERY Bilateral 1986    BREAST SURGERY Bilateral 1986    reduction    BREAST SURGERY Right 1981    rt breast exc bx benign    CARPAL TUNNEL RELEASE Bilateral     COLONOSCOPY N/A 08/14/2023    COLONOSCOPY WITH BIOPSY performed by Tone Layton MD at [facility] ENDOSCOPY    ROTATOR CUFF REPAIR      TONSILLECTOMY       Social History:    TOBACCO:   reports that she has quit smoking. Her smoking use included cigarettes. She has never been exposed to tobacco smoke. She has never used smokeless tobacco.  ETOH:   reports that she does not currently use alcohol.  DRUGS:   reports no history of drug use.  Family History:       Problem Relation Age of Onset    Breast Cancer Sister 69    Breast Cancer Paternal Aunt 70    Lung Cancer Paternal Uncle     Lung Cancer Paternal Uncle        No current facility-administered medications for this encounter.    Current Outpatient Medications:     mesalamine (LIALDA) 1.2 g EC tablet, Take 4 tablets by mouth daily (with breakfast) (Patient taking differently: Take 4 tablets by mouth daily (with breakfast) 2 in am 2 at night), Disp: 120 tablet, Rfl: 5    metoprolol tartrate (LOPRESSOR) 50 MG tablet, Take 1 tablet by mouth 2 times daily (Patient  jaundice  Neuro: A&O x 3, CN grossly intact, non-focal exam               Electronically signed by Tone Layton MD on 2/18/2025 at 5:46 AM

## 2025-02-18 NOTE — PROGRESS NOTES
Dr Barcenas at bedside and talking with patient and . Requesting and ekg   pt stated her neck is hurting while azithromycin is running through her rt E.J

## 2025-02-18 NOTE — CONSULTS
Inpatient Cardiology Consultation      Reason for Consult:  Tachycardia     Consulting Physician: Dr Barcenas     Requesting Physician:  Dr. Layton  Anesthesia - requested due to ST then vagal down during colonoscopy - aborted procedure     Date of Consultation: 2/18/2025    HISTORY OF PRESENT ILLNESS:   Lorrei Burns  is a 69 y.o.  female known to [facility] Cardiology followed by Dr. Adams seen last in office 3/2023      PMH: see below    Recent Encounters:   ED Visit 11/2024 Abd pain / Diarrhea HR 60 /67 discharged  ED Visit 1/6/25 - bloody diarrhea N/V  /84  Mag 1.8  K 3.2 BUN 11 Cr 0.7 Hgb 12.0 discharged    Presented as outpatient today for EGD (WNL ) and Colonoscopy.    Patient and her  report that she has had approximately 6 weeks of intermittent diarrhea there are days where she has no diarrhea and there are days where every time she eats she has profuse diarrhea.  Denies blood or mucus in diarrhea.  She is drinking 2 large bottles of electrolyte water per day only.  She has lost at least 10 pounds over this time.  Denies any chest pain pressure discomfort, shortness of breath at rest or dyspnea on exertion.  Sleeps on a couch with 1-2 pillows for comfort denies orthopnea or PND.  Intermittent swelling in bilateral lower extremities none recently.  Denies any specific abdominal pain indigestion or heartburn.  Denies palpitations.  Notes occasional dizziness with position changes but no recent syncopal episodes.  No recent infection fever or cough.  She is a difficult historian due to her progressive expressive aphasia and has been assisted with [facility].        During colonoscopy, procedure was terminated due to \"baseline and beta-blocker refractory tachycardia heart rate 120.  Procedure was performed under close anesthesia surveillance and was incomplete in the setting of requiring abdominal pressure which worsened her heart rate and led to a vagal response.  Due to sustained

## 2025-02-18 NOTE — ANESTHESIA PRE PROCEDURE
Department of Anesthesiology  Preprocedure Note       Name:  Lorrie Burns   Age:  69 y.o.  :  1955                                          MRN:  [id number]         Date:  2025      Surgeon: Surgeon(s):  Tone Layton MD    Procedure: Procedure(s):  EGD ESOPHAGOGASTRODUODENOSCOPY  COLONOSCOPY DIAGNOSTIC    Medications prior to admission:   Prior to Admission medications    Medication Sig Start Date End Date Taking? Authorizing Provider   mesalamine (LIALDA) 1.2 g EC tablet Take 4 tablets by mouth daily (with breakfast)  Patient taking differently: Take 4 tablets by mouth daily (with breakfast) 2 in am  2 at night 25  Yes Denise Castillo APRN - CNP   metoprolol tartrate (LOPRESSOR) 50 MG tablet Take 1 tablet by mouth 2 times daily  Patient taking differently: Take 1 tablet by mouth daily 3/5/24  Yes William Adams MD   predniSONE (DELTASONE) 10 MG tablet Take 4 tablets by mouth daily for 5 days, THEN 3.5 tablets daily for 5 days, THEN 3 tablets daily for 5 days, THEN 2.5 tablets daily for 5 days, THEN 2 tablets daily for 5 days, THEN 1.5 tablets daily for 5 days, THEN 1 tablet daily for 5 days, THEN 0.5 tablets daily for 5 days. 2/4/25 3/15/25  Denise Castillo APRN - CNP   desvenlafaxine succinate (PRISTIQ) 25 MG TB24 extended release tablet Take 1 tablet by mouth at bedtime 25   Daniella Mack DO   hydrOXYzine pamoate (VISTARIL) 25 MG capsule Take 1 capsule by mouth 3 times daily as needed for Anxiety  Patient not taking: Reported on 2025  Daniella Mack DO   vitamin D (ERGOCALCIFEROL) 1.25 MG (75728 UT) CAPS capsule Take 1 capsule by mouth Once a week at 5 PM 24   Tray Golden DO   rosuvastatin (CRESTOR) 5 MG tablet Take 1 tablet by mouth daily 24   Tray Golden F,    Multiple Vitamins-Minerals (MULTIVITAMIN ADULT PO) Take 1 tablet by mouth daily    Provider, MD Silvio       Current medications:    No current

## 2025-02-18 NOTE — DISCHARGE INSTRUCTIONS
Recommendations:  -The patient will be observed post procedure until all discharge criteria are met.    -Patient has a contact number available for emergencies.  The signs and symptoms of potential delayed complications were discussed with the patient.    -Return to normal activities tomorrow.    -Written discharge instructions were provided to the patient.    -Continue mesalamine 4 tablets daily.  -Complete entire steroid taper.  -Timeframe until next colonoscopy will be discussed in clinic and based on Clinical Guidelines regarding size, number, and pathology of polyps removed as well as adequacy of bowel prep.   -Clinic appointment scheduled 2/26.     Medications:  Mesalamine: 4 tabs daily.  Prednisone: 20mg daily.  Fiber: Take twice daily to improve loose stools.  Welchol: Take twice daily to improve loose stools

## 2025-02-18 NOTE — CARE COORDINATION
Internal Medicine On-call Care Coordination Note    I was called by the ED physician because they recommended admission for this patient and we cover their PCP.  The history as I understand it after discussion with the ED physician is as follows:    Initially presented for outpatient EGD and colonoscopy  EGD done which was WNL  During colonoscopy, procedure stopped due to \"baseline and beta-blocker refractory tachycardia heart rate 120 Procedure was performed under close anesthesia surveillance and was incomplete in the setting of requiring abdominal pressure which worsened her heart rate and led to a vagal response.  Due to sustained tachycardia procedure was aborted\"   Cardiology consulted, decision to admit    I placed admission orders.  Including:    General orders placed  Home meds ordered  Cardiology on board  Echo ordered  Monitor morning labs    Either Dr. Jensen, Dr. Bryant, or our coverage will see the patient tomorrow for H&P.    DVT prophylaxis: Lovenox  Code Status: FULL    Electronically signed by Landen Jensen MD on 2/18/2025 at 4:58 PM

## 2025-02-18 NOTE — ANESTHESIA POSTPROCEDURE EVALUATION
Department of Anesthesiology  Postprocedure Note    Patient: Lorrie Burns  MRN: [id number]  YOB: 1955  Date of evaluation: 2/18/2025    Procedure Summary       Date: 02/18/25 Room / Location: [place] [address] / [facility]    Anesthesia Start: 0956 Anesthesia Stop: 1056    Procedures:       EGD ESOPHAGOGASTRODUODENOSCOPY      COLONOSCOPY DIAGNOSTIC Diagnosis:       Left upper quadrant abdominal pain      Ulcerative colitis (HCC)      (Left upper quadrant abdominal pain [R10.12])      (Ulcerative colitis (HCC) [K51.90])    Surgeons: Tone Layton MD Responsible Provider: Renea Ennis MD    Anesthesia Type: MAC ASA Status: 3            Anesthesia Type: No value filed.    Angel Phase I: Angel Score: 10    Angel Phase II:      Anesthesia Post Evaluation    Patient location during evaluation: PACU  Patient participation: complete - patient participated  Level of consciousness: awake and alert  Pain score: 0  Airway patency: patent  Nausea & Vomiting: no nausea and no vomiting  Cardiovascular status: blood pressure returned to baseline and hemodynamically stable  Respiratory status: acceptable, spontaneous ventilation and room air  Hydration status: stable  Comments: Patient had intra-op tachycardia stable in 120s with intermittent bradycardia down to 30s. Procedure was completed and patient was transferred to ICU. Cardiology team was consulted for optimization of HR. Proper sign out was given.   Pain management: adequate and satisfactory to patient      No notable events documented.

## 2025-02-19 ENCOUNTER — APPOINTMENT (OUTPATIENT)
Age: 70
End: 2025-02-19
Attending: INTERNAL MEDICINE
Payer: MEDICARE

## 2025-02-19 PROBLEM — I47.10 SUPRAVENTRICULAR TACHYCARDIA: Status: ACTIVE | Noted: 2025-02-19

## 2025-02-19 LAB
ANION GAP SERPL CALCULATED.3IONS-SCNC: 10 MMOL/L (ref 7–16)
BASOPHILS # BLD: 0.04 K/UL (ref 0–0.2)
BASOPHILS NFR BLD: 1 % (ref 0–2)
BUN SERPL-MCNC: 7 MG/DL (ref 6–23)
CALCIUM SERPL-MCNC: 8.3 MG/DL (ref 8.6–10.2)
CHLORIDE SERPL-SCNC: 108 MMOL/L (ref 98–107)
CO2 SERPL-SCNC: 22 MMOL/L (ref 22–29)
CREAT SERPL-MCNC: 0.7 MG/DL (ref 0.5–1)
ECHO AO ASC DIAM: 3.2 CM
ECHO AO ASCENDING AORTA INDEX: 2.01 CM/M2
ECHO AV AREA PEAK VELOCITY: 2.3 CM2
ECHO AV AREA VTI: 2.4 CM2
ECHO AV AREA/BSA PEAK VELOCITY: 1.4 CM2/M2
ECHO AV AREA/BSA VTI: 1.5 CM2/M2
ECHO AV CUSP MM: 1.2 CM
ECHO AV MEAN GRADIENT: 3 MMHG
ECHO AV MEAN VELOCITY: 0.8 M/S
ECHO AV PEAK GRADIENT: 6 MMHG
ECHO AV PEAK VELOCITY: 1.2 M/S
ECHO AV VELOCITY RATIO: 0.83
ECHO AV VTI: 18.6 CM
ECHO BSA: 1.6 M2
ECHO EST RA PRESSURE: 3 MMHG
ECHO LA DIAMETER INDEX: 1.76 CM/M2
ECHO LA DIAMETER: 2.8 CM
ECHO LA VOL A-L A2C: 35 ML (ref 22–52)
ECHO LA VOL A-L A4C: 28 ML (ref 22–52)
ECHO LA VOL MOD A2C: 33 ML (ref 22–52)
ECHO LA VOL MOD A4C: 26 ML (ref 22–52)
ECHO LA VOLUME AREA LENGTH: 32 ML
ECHO LA VOLUME INDEX A-L A2C: 22 ML/M2 (ref 16–34)
ECHO LA VOLUME INDEX A-L A4C: 18 ML/M2 (ref 16–34)
ECHO LA VOLUME INDEX AREA LENGTH: 20 ML/M2 (ref 16–34)
ECHO LA VOLUME INDEX MOD A2C: 21 ML/M2 (ref 16–34)
ECHO LA VOLUME INDEX MOD A4C: 16 ML/M2 (ref 16–34)
ECHO LV EF PHYSICIAN: 55 %
ECHO LV FRACTIONAL SHORTENING: 32 % (ref 28–44)
ECHO LV INTERNAL DIMENSION DIASTOLE INDEX: 2.39 CM/M2
ECHO LV INTERNAL DIMENSION DIASTOLIC: 3.8 CM (ref 3.9–5.3)
ECHO LV INTERNAL DIMENSION SYSTOLIC INDEX: 1.64 CM/M2
ECHO LV INTERNAL DIMENSION SYSTOLIC: 2.6 CM
ECHO LV ISOVOLUMETRIC RELAXATION TIME (IVRT): 64.6 MS
ECHO LV IVSD: 0.9 CM (ref 0.6–0.9)
ECHO LV IVSS: 1.2 CM
ECHO LV MASS 2D: 86 G (ref 67–162)
ECHO LV MASS INDEX 2D: 54.1 G/M2 (ref 43–95)
ECHO LV POSTERIOR WALL DIASTOLIC: 0.7 CM (ref 0.6–0.9)
ECHO LV POSTERIOR WALL SYSTOLIC: 1.2 CM
ECHO LV RELATIVE WALL THICKNESS RATIO: 0.37
ECHO LVOT AREA: 2.8 CM2
ECHO LVOT AV VTI INDEX: 0.9
ECHO LVOT DIAM: 1.9 CM
ECHO LVOT MEAN GRADIENT: 2 MMHG
ECHO LVOT PEAK GRADIENT: 4 MMHG
ECHO LVOT PEAK VELOCITY: 1 M/S
ECHO LVOT STROKE VOLUME INDEX: 29.9 ML/M2
ECHO LVOT SV: 47.6 ML
ECHO LVOT VTI: 16.8 CM
ECHO MV AREA PHT: 6.7 CM2
ECHO MV AREA VTI: 3.4 CM2
ECHO MV E DECELERATION TIME (DT): 144.5 MS
ECHO MV LVOT VTI INDEX: 0.84
ECHO MV MAX VELOCITY: 1.3 M/S
ECHO MV MEAN GRADIENT: 4 MMHG
ECHO MV MEAN VELOCITY: 0.9 M/S
ECHO MV PEAK GRADIENT: 7 MMHG
ECHO MV PRESSURE HALF TIME (PHT): 33 MS
ECHO MV VTI: 14.1 CM
ECHO PV MAX VELOCITY: 1.1 M/S
ECHO PV MEAN GRADIENT: 2 MMHG
ECHO PV MEAN VELOCITY: 0.7 M/S
ECHO PV PEAK GRADIENT: 4 MMHG
ECHO PV VTI: 15.5 CM
ECHO RIGHT VENTRICULAR SYSTOLIC PRESSURE (RVSP): 32 MMHG
ECHO RV INTERNAL DIMENSION: 2.9 CM
ECHO TV REGURGITANT MAX VELOCITY: 2.68 M/S
ECHO TV REGURGITANT PEAK GRADIENT: 29 MMHG
EOSINOPHIL # BLD: 0.06 K/UL (ref 0.05–0.5)
EOSINOPHILS RELATIVE PERCENT: 1 % (ref 0–6)
ERYTHROCYTE [DISTWIDTH] IN BLOOD BY AUTOMATED COUNT: 13.2 % (ref 11.5–15)
GFR, ESTIMATED: >90 ML/MIN/1.73M2
GLUCOSE BLD-MCNC: 84 MG/DL (ref 74–99)
GLUCOSE SERPL-MCNC: 63 MG/DL (ref 74–99)
HCT VFR BLD AUTO: 38.9 % (ref 34–48)
HGB BLD-MCNC: 12.4 G/DL (ref 11.5–15.5)
IMM GRANULOCYTES # BLD AUTO: 0.1 K/UL (ref 0–0.58)
IMM GRANULOCYTES NFR BLD: 2 % (ref 0–5)
LYMPHOCYTES NFR BLD: 1.15 K/UL (ref 1.5–4)
LYMPHOCYTES RELATIVE PERCENT: 19 % (ref 20–42)
MAGNESIUM SERPL-MCNC: 2 MG/DL (ref 1.6–2.6)
MCH RBC QN AUTO: 30.2 PG (ref 26–35)
MCHC RBC AUTO-ENTMCNC: 31.9 G/DL (ref 32–34.5)
MCV RBC AUTO: 94.6 FL (ref 80–99.9)
MONOCYTES NFR BLD: 0.49 K/UL (ref 0.1–0.95)
MONOCYTES NFR BLD: 8 % (ref 2–12)
NEUTROPHILS NFR BLD: 70 % (ref 43–80)
NEUTS SEG NFR BLD: 4.3 K/UL (ref 1.8–7.3)
PLATELET # BLD AUTO: 226 K/UL (ref 130–450)
PMV BLD AUTO: 9.3 FL (ref 7–12)
POTASSIUM SERPL-SCNC: 4.3 MMOL/L (ref 3.5–5)
RBC # BLD AUTO: 4.11 M/UL (ref 3.5–5.5)
SODIUM SERPL-SCNC: 140 MMOL/L (ref 132–146)
WBC OTHER # BLD: 6.1 K/UL (ref 4.5–11.5)

## 2025-02-19 PROCEDURE — 2500000003 HC RX 250 WO HCPCS: Performed by: STUDENT IN AN ORGANIZED HEALTH CARE EDUCATION/TRAINING PROGRAM

## 2025-02-19 PROCEDURE — 83735 ASSAY OF MAGNESIUM: CPT

## 2025-02-19 PROCEDURE — 96374 THER/PROPH/DIAG INJ IV PUSH: CPT

## 2025-02-19 PROCEDURE — 6370000000 HC RX 637 (ALT 250 FOR IP): Performed by: INTERNAL MEDICINE

## 2025-02-19 PROCEDURE — 6370000000 HC RX 637 (ALT 250 FOR IP): Performed by: NURSE PRACTITIONER

## 2025-02-19 PROCEDURE — 80048 BASIC METABOLIC PNL TOTAL CA: CPT

## 2025-02-19 PROCEDURE — 96361 HYDRATE IV INFUSION ADD-ON: CPT

## 2025-02-19 PROCEDURE — G0378 HOSPITAL OBSERVATION PER HR: HCPCS

## 2025-02-19 PROCEDURE — 82962 GLUCOSE BLOOD TEST: CPT

## 2025-02-19 PROCEDURE — 6370000000 HC RX 637 (ALT 250 FOR IP): Performed by: STUDENT IN AN ORGANIZED HEALTH CARE EDUCATION/TRAINING PROGRAM

## 2025-02-19 PROCEDURE — 6360000002 HC RX W HCPCS

## 2025-02-19 PROCEDURE — 93306 TTE W/DOPPLER COMPLETE: CPT

## 2025-02-19 PROCEDURE — 96372 THER/PROPH/DIAG INJ SC/IM: CPT

## 2025-02-19 PROCEDURE — 85025 COMPLETE CBC W/AUTO DIFF WBC: CPT

## 2025-02-19 PROCEDURE — 6360000002 HC RX W HCPCS: Performed by: STUDENT IN AN ORGANIZED HEALTH CARE EDUCATION/TRAINING PROGRAM

## 2025-02-19 PROCEDURE — 93306 TTE W/DOPPLER COMPLETE: CPT | Performed by: INTERNAL MEDICINE

## 2025-02-19 PROCEDURE — 36415 COLL VENOUS BLD VENIPUNCTURE: CPT

## 2025-02-19 PROCEDURE — 93005 ELECTROCARDIOGRAM TRACING: CPT | Performed by: INTERNAL MEDICINE

## 2025-02-19 RX ORDER — MESALAMINE 400 MG/1
800 CAPSULE, DELAYED RELEASE ORAL 4 TIMES DAILY
Status: DISCONTINUED | OUTPATIENT
Start: 2025-02-19 | End: 2025-02-21

## 2025-02-19 RX ORDER — FLECAINIDE ACETATE 50 MG/1
50 TABLET ORAL 2 TIMES DAILY
Status: DISCONTINUED | OUTPATIENT
Start: 2025-02-19 | End: 2025-02-21 | Stop reason: HOSPADM

## 2025-02-19 RX ORDER — ROSUVASTATIN CALCIUM 5 MG/1
5 TABLET, COATED ORAL DAILY
Status: DISCONTINUED | OUTPATIENT
Start: 2025-02-19 | End: 2025-02-21 | Stop reason: HOSPADM

## 2025-02-19 RX ORDER — DEXTROSE MONOHYDRATE 100 MG/ML
INJECTION, SOLUTION INTRAVENOUS CONTINUOUS PRN
Status: DISCONTINUED | OUTPATIENT
Start: 2025-02-19 | End: 2025-02-21 | Stop reason: HOSPADM

## 2025-02-19 RX ORDER — METOPROLOL SUCCINATE 50 MG/1
50 TABLET, EXTENDED RELEASE ORAL 2 TIMES DAILY
Status: DISCONTINUED | OUTPATIENT
Start: 2025-02-20 | End: 2025-02-21 | Stop reason: HOSPADM

## 2025-02-19 RX ORDER — GLUCAGON 1 MG/ML
1 KIT INJECTION PRN
Status: DISCONTINUED | OUTPATIENT
Start: 2025-02-19 | End: 2025-02-21 | Stop reason: HOSPADM

## 2025-02-19 RX ORDER — METOPROLOL SUCCINATE 50 MG/1
100 TABLET, EXTENDED RELEASE ORAL 2 TIMES DAILY
Status: DISCONTINUED | OUTPATIENT
Start: 2025-02-19 | End: 2025-02-19

## 2025-02-19 RX ORDER — ADENOSINE 3 MG/ML
6 INJECTION, SOLUTION INTRAVENOUS ONCE
Status: COMPLETED | OUTPATIENT
Start: 2025-02-19 | End: 2025-02-19

## 2025-02-19 RX ORDER — METOPROLOL TARTRATE 50 MG
50 TABLET ORAL ONCE
Status: COMPLETED | OUTPATIENT
Start: 2025-02-19 | End: 2025-02-19

## 2025-02-19 RX ORDER — ADENOSINE 3 MG/ML
INJECTION, SOLUTION INTRAVENOUS
Status: COMPLETED
Start: 2025-02-19 | End: 2025-02-19

## 2025-02-19 RX ORDER — HYDROXYZINE HYDROCHLORIDE 25 MG/1
25 TABLET, FILM COATED ORAL 3 TIMES DAILY PRN
Status: DISCONTINUED | OUTPATIENT
Start: 2025-02-19 | End: 2025-02-21 | Stop reason: HOSPADM

## 2025-02-19 RX ORDER — PREDNISONE 5 MG/1
10 TABLET ORAL 2 TIMES DAILY
Status: DISCONTINUED | OUTPATIENT
Start: 2025-02-19 | End: 2025-02-21 | Stop reason: HOSPADM

## 2025-02-19 RX ORDER — METOPROLOL TARTRATE 50 MG
50 TABLET ORAL 2 TIMES DAILY
Status: DISCONTINUED | OUTPATIENT
Start: 2025-02-19 | End: 2025-02-19

## 2025-02-19 RX ADMIN — HYDROXYZINE HYDROCHLORIDE 25 MG: 25 TABLET, FILM COATED ORAL at 17:45

## 2025-02-19 RX ADMIN — ADENOSINE 6 MG: 3 INJECTION, SOLUTION INTRAVENOUS at 08:51

## 2025-02-19 RX ADMIN — METOPROLOL TARTRATE 50 MG: 50 TABLET, FILM COATED ORAL at 09:01

## 2025-02-19 RX ADMIN — ROSUVASTATIN CALCIUM 5 MG: 5 TABLET, FILM COATED ORAL at 19:51

## 2025-02-19 RX ADMIN — SODIUM CHLORIDE, PRESERVATIVE FREE 10 ML: 5 INJECTION INTRAVENOUS at 19:56

## 2025-02-19 RX ADMIN — MESALAMINE 800 MG: 400 CAPSULE, DELAYED RELEASE ORAL at 19:52

## 2025-02-19 RX ADMIN — METOPROLOL TARTRATE 50 MG: 50 TABLET, FILM COATED ORAL at 11:47

## 2025-02-19 RX ADMIN — MESALAMINE 800 MG: 400 CAPSULE, DELAYED RELEASE ORAL at 17:02

## 2025-02-19 RX ADMIN — PREDNISONE 10 MG: 5 TABLET ORAL at 19:52

## 2025-02-19 RX ADMIN — FLECAINIDE ACETATE 50 MG: 50 TABLET ORAL at 20:51

## 2025-02-19 RX ADMIN — ENOXAPARIN SODIUM 40 MG: 100 INJECTION SUBCUTANEOUS at 08:08

## 2025-02-19 RX ADMIN — SODIUM CHLORIDE, PRESERVATIVE FREE 10 ML: 5 INJECTION INTRAVENOUS at 08:08

## 2025-02-19 RX ADMIN — PREDNISONE 10 MG: 5 TABLET ORAL at 13:34

## 2025-02-19 RX ADMIN — METOPROLOL SUCCINATE 100 MG: 50 TABLET, EXTENDED RELEASE ORAL at 19:52

## 2025-02-19 NOTE — H&P
Internal Medicine History & Physical     Name: Lorrie Burns  : 1955  Chief Complaint: Tachycardia  Primary Care Physician: Daniella Mack DO  Admission date: 2025  Date of service: 2025     History of Present Illness  Lorrie is a 69 y.o. year old female with a PMH of who presented with a chief complaint of tachycardia.  Patient initially presented for outpatient EGD and colonoscopy.  EGD was completed and showed overall WNL.  As patient was brought to undergo colonoscopy, procedure was stopped due to concerns of refractory tachycardia which HR: 120.  Due to sustained tachycardia procedure was aborted.  Cardiology was consulted.  And decision was made for admission for further management        Past Medical History:   Diagnosis Date    C. difficile colitis     Encounter for screening colonoscopy     Hyperlipidemia     Hypertension     Memory loss     patient aphasic    Migraines     Vitamin D deficiency 2016       Past Surgical History:   Procedure Laterality Date    BREAST BIOPSY      BREAST REDUCTION SURGERY Bilateral 1986    BREAST SURGERY Bilateral     reduction    BREAST SURGERY Right 1981    rt breast exc bx benign    CARPAL TUNNEL RELEASE Bilateral     COLONOSCOPY N/A 2023    COLONOSCOPY WITH BIOPSY performed by Tone Layton MD at [facility] ENDOSCOPY    ROTATOR CUFF REPAIR      TONSILLECTOMY         Family History   Problem Relation Age of Onset    Breast Cancer Sister 69    Breast Cancer Paternal Aunt 70    Lung Cancer Paternal Uncle     Lung Cancer Paternal Uncle          Social History  Illicit drugs: Denies   TOBACCO:   reports that she has quit smoking. Her smoking use included cigarettes. She has never been exposed to tobacco smoke. She has never used smokeless tobacco.  ETOH:   reports that she does not currently use alcohol.    Home Medications  Prior to Admission medications    Medication Sig Start Date End Date Taking? Authorizing Provider  well    Hx of Ulcerative Colitis  Initially presented for EGD and colonoscopy  GI on board    Hx of Anxiety - PRN Vistaril TID    PT/OT  Consults Cardiology  DVT prophylaxis Lovenox  Code Status FULL  Discharge plan: TBD pending clinical improvement     Landen Jensen MD  Internal medicine   2/19/2025  7:57 AM    I can be reached through [employer].    NOTE:  This report was transcribed using voice recognition software.  Every effort was made to ensure accuracy; however, inadvertent computerized transcription errors may be present.

## 2025-02-19 NOTE — ACP (ADVANCE CARE PLANNING)
Advance Care Planning   Healthcare Decision Maker:    Primary Decision Maker: Matias Burns \"Ilya\" - Spouse - [phone]    Supplemental (Other) Decision Maker: Jun Bonilla - Child - [phone]    Supplemental (Other) Decision Maker: HANNAHDREA - Child - [phone]    Today we documented Decision Maker(s) consistent with Legal Next of Kin hierarchy.    Electronically signed by ROSE MARIE Ron on 2/19/2025 at 9:41 AM

## 2025-02-19 NOTE — CONSULTS
BID, with first dose being given this evening. Bedside nursing staff states HR jumps up with ambulation to the 150-160's. Patient denies any symptoms of dizziness, heart racing, fluttering, palpitations, SOB, near syncope. Has had two syncopal episodes in the past where she hit her head, that was further evaluated with CT brain which was unremarkable per her .  I attempted to speak to the patient tonight but due to confusion she could not give me any appropriate answers or significant medical history.  I therefore spoke to her  on the telephone and the history is as noted above.    Cardiac electrophysiology service is consulted for Sinus tachycardia versus SVT.    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 difficile colitis 2023    Encounter for screening colonoscopy     Hyperlipidemia     Hypertension     Memory loss     patient aphasic    Migraines     Vitamin D deficiency 11/11/2016       Family History   Problem Relation Age of Onset    Breast Cancer Sister 69    Breast Cancer Paternal Aunt 70    Lung Cancer Paternal Uncle     Lung Cancer Paternal Uncle        Social History     Tobacco Use    Smoking status: Former     Types: Cigarettes     Passive exposure: Never    Smokeless tobacco: Never    Tobacco comments:      internal dimensions were normal in diastole and systole.   No regional wall motion abnormalities seen.   Normal left ventricular ejection fraction.   Physiologic and/or trace aortic regurgitation is noted.   There is a trivial circumferential pericardial effusion noted.       Outpatient Monitor: 3/6/23      I have independently reviewed all of the ECGs and rhythm strips per above     Assessment/Plan: This is a 69 y.o. female with a history of     1. SVT  -Initially first seen 2 years ago when hospitalized at [facility] for CDIFF pancolitis . Had episodes of PSVT and  evaluated patient. Her episodes resolved spontaneously during that admission.  -was evaluated at [facility] for SVT 5/11/23- they reported Zio was not particularly remarkable and offered no other recommendations other rthan obtaining a [employer] and staying on beta blocker, briefly touched on ablation therapy.  -On Metoprolol 25mg BID---> Has been increased to 100mg BID  starting this evening  -Received Adenosine 6mg x 1 today, which did terminate tachycardia transiently.  Rhythm strip suggests SVT which did terminate transiently with IV adenosine    2. Aphasia from unknown source  -Denies history of CVA  MRI brain x 2 have shown no etiology for her aphasia.  Had blunt injury to her head as a child x 2 occasions.  Vasovagal syncope x2 where she hit her head both times(CT head negative per )    3. Generalized anxiety disorder  -Takes Vistaril TID PRN    4. Ulcerative Colitis  - Has been on prednisone for the last month due to diarrhea x 5-6 weeks  -Continues to be on Prednisone 10mg BID  -EGD normal  -Colonoscopy showed hemorroids on perianal exam, diffuse inflammation characterized by congestion and loss of vascularity, mild erythema/superficial erosion noted. No significantly active inflammation or ulceration appreciated.   \"During colonoscopy, procedure was terminated due to \"baseline and beta-blocker refractory tachycardia heart rate 120.

## 2025-02-19 NOTE — PROGRESS NOTES
Patient upset, agitated and confused.  Patient wants  called.   notified and spoke to patient on phone. Patient appears calmer due to  coming in.

## 2025-02-19 NOTE — PROGRESS NOTES
Spiritual Health History and Assessment/Progress Note  [facility]    (P) Initial Encounter, Spiritual/Emotional Needs,  ,  ,      Name: Lorrie Burns MRN: [id number]    Age: 69 y.o.     Sex: female   Language: English   Buddhism: Episcopalian   Irregular heartbeat     Date: 2/19/2025                           Spiritual Assessment began in [facility]        Referral/Consult From: (P) Rounding   Encounter Overview/Reason: (P) Initial Encounter, Spiritual/Emotional Needs  Service Provided For: (P) Patient and family together    Charlene, Belief, Meaning:   Patient identifies as spiritual, is connected with a charlene tradition or spiritual practice, and has beliefs or practices that help with coping during difficult times  Family/Friends identify as spiritual, are connected with a charlene tradition or spiritual practice, and have beliefs or practices that help with coping during difficult times      Importance and Influence:  Patient have spiritual/personal beliefs that influence decisions regarding the patient's health.  Family/Friends have spiritual/personal beliefs that influence decisions regarding the patient's health    Community:  Patient is connected with a spiritual community and feels well-supported. Support system includes: Spouse/Partner, Children, Charlene Community, and Extended family  Family/Friends are connected with a spiritual community: and feel well-supported. Support system includes: Spouse/Partner, Children, Charlene Community, and Extended family    Assessment and Plan of Care:     Patient Interventions include: Facilitated expression of thoughts and feelings, Explored spiritual coping/struggle/distress, Engaged in theological reflection, and Facilitated life review and/ or legacy  Family/Friends Interventions include: Facilitated expression of thoughts and feelings, Explored spiritual coping/struggle/distress, Engaged in theological reflection, and Facilitated life review and/or

## 2025-02-19 NOTE — PROGRESS NOTES
Patient arrived to floor.  VS stable.  Patient Aox4.  Call light placed within reach, side rails up x 2.  Sophia Herron RN

## 2025-02-19 NOTE — PROGRESS NOTES
PROGRESS NOTE     CARDIOLOGY    Chief complaint: Seen today for follow up, management & recommendations for tachycardia with episode of vasovagal near syncope and pauses.    She denies chest pain or shortness of breath today.    Wt Readings from Last 3 Encounters:   02/18/25 58.7 kg (129 lb 6.6 oz)   02/04/25 59 kg (130 lb)   01/28/25 59.9 kg (132 lb)     Temp Readings from Last 3 Encounters:   02/19/25 98.6 °F (37 °C) (Temporal)   02/04/25 97 °F (36.1 °C) (Temporal)   01/28/25 97.3 °F (36.3 °C)     BP Readings from Last 3 Encounters:   02/19/25 123/84   02/04/25 124/70   01/28/25 110/80     Pulse Readings from Last 3 Encounters:   02/19/25 (!) 137   02/04/25 100   01/28/25 64         Intake/Output Summary (Last 24 hours) at 2/19/2025 0858  Last data filed at 2/18/2025 1036  Gross per 24 hour   Intake 1000 ml   Output --   Net 1000 ml       Recent Labs     02/18/25  1431 02/19/25  0502   WBC 9.8 6.1   HGB 12.8 12.4   HCT 40.2 38.9   MCV 94.6 94.6    226     Recent Labs     02/18/25  1431 02/19/25  0502    140   K 4.2 4.3    108*   CO2 22 22   BUN 14 7   CREATININE 0.6 0.7   MG 1.9 2.0     No results for input(s): \"PROTIME\", \"INR\" in the last 72 hours.  No results for input(s): \"CKTOTAL\", \"CKMB\", \"CKMBINDEX\", \"TROPONINI\" in the last 72 hours.  No results for input(s): \"BNP\" in the last 72 hours.  No results for input(s): \"CHOL\", \"HDL\", \"TRIG\" in the last 72 hours.    Invalid input(s): \"CHOLHDLR\", \"LDLCALCU\"  No results for input(s): \"TROPHS\" in the last 72 hours.      hydrOXYzine HCl (ATARAX) tablet 25 mg, TID PRN  rosuvastatin (CRESTOR) tablet 5 mg, Daily  glucose chewable tablet 16 g, PRN  dextrose bolus 10% 125 mL, PRN   Or  dextrose bolus 10% 250 mL, PRN  glucagon injection 1 mg, PRN  dextrose 10 % infusion, Continuous PRN  metoprolol tartrate (LOPRESSOR) tablet 50 mg, BID  sodium chloride flush 0.9 % injection 5-40 mL, 2 times per day  sodium chloride flush 0.9 % injection 5-40 mL, PRN  0.9

## 2025-02-19 NOTE — PROGRESS NOTES
[facility]   Gastroenterology, Hepatology, &  Advanced Endoscopy    Progress Note      ASSESSMENT AND PLAN:    69y/F w/ Ulcerative Colitis who presents for endoscopic evaluation of abdominal pain and restaging of disease    EGD 2/18/25  Esophagus, Z-Line, stomach, duodenum, and cardia/gastric fundus were all normal.    Colonoscopy 2/18/25  Hemorrhoids were found on perianal exam.   Diffuse mil inflammation characterized by congestion and loss of vascularity, mild erythema/superficial erosion noted. No significantly active inflammation or ulceration appreciated.  During colonoscopy, procedure was terminated due to \"baseline and beta-blocker refractory tachycardia heart rate 120. Procedure was performed under close anesthesia surveillance and was incomplete in the setting of requiring abdominal pressure which worsened her heart rate and led to a vagal response. Due to sustained tachycardia procedure was aborted\"  consulted, appreciate input.   Persistent supraventricular tachycardia. Significant artifact on telemetry. Heart rates in the 140s and 150s. Status post hydration.   Metoprolol and resumed at 50 mg twice daily, staff will reach out to Dr. Barcenas with heart rate and BP.    PLAN:  - Mesalamine 800 mg QID  -Resume Prednisone 10 mg BID             HISTORY OF PRESENT ILLNESS:      Ms. Lorrie Burns is a 69y/F w/ Ulcerative Colitis who presents for endoscopic evaluation of abdominal pain and restaging of disease.     Past Medical History:        Diagnosis Date    C. difficile colitis 2023    Encounter for screening colonoscopy     Hyperlipidemia     Hypertension     Memory loss     patient aphasic    Migraines     Vitamin D deficiency 11/11/2016     Past Surgical History:        Procedure Laterality Date    BREAST BIOPSY      BREAST REDUCTION SURGERY Bilateral 1986    BREAST SURGERY Bilateral 1986    reduction    BREAST SURGERY Right 1981    rt breast exc bx benign    CARPAL TUNNEL RELEASE  Bilateral     COLONOSCOPY N/A 08/14/2023    COLONOSCOPY WITH BIOPSY performed by Tone Layton MD at [facility] ENDOSCOPY    ROTATOR CUFF REPAIR      TONSILLECTOMY       Social History:    TOBACCO:   reports that she has quit smoking. Her smoking use included cigarettes. She has never been exposed to tobacco smoke. She has never used smokeless tobacco.  ETOH:   reports that she does not currently use alcohol.  DRUGS:   reports no history of drug use.  Family History:       Problem Relation Age of Onset    Breast Cancer Sister 69    Breast Cancer Paternal Aunt 70    Lung Cancer Paternal Uncle     Lung Cancer Paternal Uncle          Current Facility-Administered Medications:     hydrOXYzine HCl (ATARAX) tablet 25 mg, 25 mg, Oral, TID PRN, Landen Jensen MD    rosuvastatin (CRESTOR) tablet 5 mg, 5 mg, Oral, Daily, Landen Jensen MD    glucose chewable tablet 16 g, 4 tablet, Oral, PRN, Landen Jensen MD    dextrose bolus 10% 125 mL, 125 mL, IntraVENous, PRN **OR** dextrose bolus 10% 250 mL, 250 mL, IntraVENous, PRN, Landen Jensen MD    glucagon injection 1 mg, 1 mg, SubCUTAneous, PRN, Landen Jensen MD    dextrose 10 % infusion, , IntraVENous, Continuous PRN, Landen Jensen MD    metoprolol tartrate (LOPRESSOR) tablet 50 mg, 50 mg, Oral, BID, Deep Barcenas, DO, 50 mg at 02/19/25 0901    sodium chloride flush 0.9 % injection 5-40 mL, 5-40 mL, IntraVENous, 2 times per day, Tone Layton MD, 10 mL at 02/19/25 0808    sodium chloride flush 0.9 % injection 5-40 mL, 5-40 mL, IntraVENous, PRN, Tone Layton MD    0.9 % sodium chloride infusion, , IntraVENous, PRN, Tone Layton MD    sodium chloride flush 0.9 % injection 5-40 mL, 5-40 mL, IntraVENous, 2 times per day, Landen Jensen MD, 10 mL at 02/19/25 0808    sodium chloride flush 0.9 % injection 5-40 mL, 5-40 mL, IntraVENous, PRNCamila Isaiah, MD    0.9 % sodium chloride infusion, , IntraVENous, PRN, Landen Jensen MD    potassium chloride (KLOR-CON M) extended release tablet

## 2025-02-19 NOTE — PROGRESS NOTES
Mike Benavidez notified via [employer] Dr. Barcenas wanted to be notified of pt BP (122/80) and HR (126 bpm) 2 hours post metoprolol administration.

## 2025-02-19 NOTE — CARE COORDINATION
Patient presented for endoscopic evaluation of abdominal pain and restaging of disease; admitted for left upper quadrant abdominal pain, ulcerative colitis and irregular heartbeat. Met with patient and her  at bedside for transition of care planning. Patient reports she lives in a 2-story home with her , 2 steps to enter, is currently independent in the room, ambulates without a device and drives; no home DME. Uses [employer] and PCP is Dr. Daniella Mack. Patient plans to return home, no needs and  to transport. Patient s/p EGD and pending an echo; cardiology and GI following.     Case Management Assessment  Initial Evaluation    Date/Time of Evaluation: 2/19/2025 9:38 AM  Assessment Completed by: ROSE MARIE Ron    If patient is discharged prior to next notation, then this note serves as note for discharge by case management.    Patient Name: Lorrie Burns                   YOB: 1955  Diagnosis: Left upper quadrant abdominal pain [R10.12]  Ulcerative colitis (HCC) [K51.90]  Irregular heartbeat [I49.9]                   Date / Time: 2/18/2025  7:53 AM    Patient Admission Status: Observation   Readmission Risk (Low < 19, Mod (19-27), High > 27): No data recorded  Current PCP: Daniella Mack, DO  PCP verified by ? Yes    Chart Reviewed: Yes      History Provided by: Patient  Patient Orientation: Alert and Oriented    Patient Cognition: Alert    Hospitalization in the last 30 days (Readmission):  No    If yes, Readmission Assessment in CM Navigator will be completed.    Advance Directives:      Code Status: Full Code   Patient's Primary Decision Maker is: Legal Next of Kin    Primary Decision Maker: Matias Burns \"Ilya\" - Spouse - [phone]    Discharge Planning:    Patient lives with: Spouse/Significant Other Type of Home: House  Primary Care Giver: Self  Patient Support Systems include: Spouse/Significant Other, Children   Current

## 2025-02-20 LAB
BASOPHILS # BLD: 0.02 K/UL (ref 0–0.2)
BASOPHILS NFR BLD: 0 % (ref 0–2)
EKG ATRIAL RATE: 117 BPM
EKG P-R INTERVAL: 204 MS
EKG Q-T INTERVAL: 274 MS
EKG QRS DURATION: 72 MS
EKG QTC CALCULATION (BAZETT): 382 MS
EKG R AXIS: -32 DEGREES
EKG T AXIS: -23 DEGREES
EKG VENTRICULAR RATE: 117 BPM
EOSINOPHIL # BLD: 0 K/UL (ref 0.05–0.5)
EOSINOPHILS RELATIVE PERCENT: 0 % (ref 0–6)
ERYTHROCYTE [DISTWIDTH] IN BLOOD BY AUTOMATED COUNT: 13.1 % (ref 11.5–15)
HCT VFR BLD AUTO: 40 % (ref 34–48)
HGB BLD-MCNC: 13.1 G/DL (ref 11.5–15.5)
IMM GRANULOCYTES # BLD AUTO: 0.09 K/UL (ref 0–0.58)
IMM GRANULOCYTES NFR BLD: 2 % (ref 0–5)
LYMPHOCYTES NFR BLD: 0.73 K/UL (ref 1.5–4)
LYMPHOCYTES RELATIVE PERCENT: 14 % (ref 20–42)
MCH RBC QN AUTO: 29.7 PG (ref 26–35)
MCHC RBC AUTO-ENTMCNC: 32.8 G/DL (ref 32–34.5)
MCV RBC AUTO: 90.7 FL (ref 80–99.9)
MONOCYTES NFR BLD: 0.33 K/UL (ref 0.1–0.95)
MONOCYTES NFR BLD: 6 % (ref 2–12)
NEUTROPHILS NFR BLD: 78 % (ref 43–80)
NEUTS SEG NFR BLD: 4.09 K/UL (ref 1.8–7.3)
PLATELET # BLD AUTO: 245 K/UL (ref 130–450)
PMV BLD AUTO: 9.4 FL (ref 7–12)
RBC # BLD AUTO: 4.41 M/UL (ref 3.5–5.5)
WBC OTHER # BLD: 5.3 K/UL (ref 4.5–11.5)

## 2025-02-20 PROCEDURE — 2500000003 HC RX 250 WO HCPCS: Performed by: STUDENT IN AN ORGANIZED HEALTH CARE EDUCATION/TRAINING PROGRAM

## 2025-02-20 PROCEDURE — 6370000000 HC RX 637 (ALT 250 FOR IP): Performed by: INTERNAL MEDICINE

## 2025-02-20 PROCEDURE — G0378 HOSPITAL OBSERVATION PER HR: HCPCS

## 2025-02-20 PROCEDURE — 99232 SBSQ HOSP IP/OBS MODERATE 35: CPT | Performed by: NURSE PRACTITIONER

## 2025-02-20 PROCEDURE — 85025 COMPLETE CBC W/AUTO DIFF WBC: CPT

## 2025-02-20 PROCEDURE — 96372 THER/PROPH/DIAG INJ SC/IM: CPT

## 2025-02-20 PROCEDURE — 6370000000 HC RX 637 (ALT 250 FOR IP): Performed by: NURSE PRACTITIONER

## 2025-02-20 PROCEDURE — 93010 ELECTROCARDIOGRAM REPORT: CPT | Performed by: INTERNAL MEDICINE

## 2025-02-20 PROCEDURE — 36415 COLL VENOUS BLD VENIPUNCTURE: CPT

## 2025-02-20 PROCEDURE — 6370000000 HC RX 637 (ALT 250 FOR IP): Performed by: STUDENT IN AN ORGANIZED HEALTH CARE EDUCATION/TRAINING PROGRAM

## 2025-02-20 PROCEDURE — 6360000002 HC RX W HCPCS: Performed by: STUDENT IN AN ORGANIZED HEALTH CARE EDUCATION/TRAINING PROGRAM

## 2025-02-20 RX ADMIN — SODIUM CHLORIDE, PRESERVATIVE FREE 10 ML: 5 INJECTION INTRAVENOUS at 19:50

## 2025-02-20 RX ADMIN — ROSUVASTATIN CALCIUM 5 MG: 5 TABLET, FILM COATED ORAL at 19:50

## 2025-02-20 RX ADMIN — MESALAMINE 800 MG: 400 CAPSULE, DELAYED RELEASE ORAL at 17:24

## 2025-02-20 RX ADMIN — MESALAMINE 800 MG: 400 CAPSULE, DELAYED RELEASE ORAL at 19:50

## 2025-02-20 RX ADMIN — PREDNISONE 10 MG: 5 TABLET ORAL at 19:50

## 2025-02-20 RX ADMIN — MESALAMINE 800 MG: 400 CAPSULE, DELAYED RELEASE ORAL at 08:33

## 2025-02-20 RX ADMIN — FLECAINIDE ACETATE 50 MG: 50 TABLET ORAL at 08:33

## 2025-02-20 RX ADMIN — PREDNISONE 10 MG: 5 TABLET ORAL at 08:33

## 2025-02-20 RX ADMIN — MESALAMINE 800 MG: 400 CAPSULE, DELAYED RELEASE ORAL at 12:50

## 2025-02-20 RX ADMIN — ENOXAPARIN SODIUM 40 MG: 100 INJECTION SUBCUTANEOUS at 08:33

## 2025-02-20 RX ADMIN — METOPROLOL SUCCINATE 50 MG: 50 TABLET, EXTENDED RELEASE ORAL at 19:50

## 2025-02-20 RX ADMIN — FLECAINIDE ACETATE 50 MG: 50 TABLET ORAL at 17:24

## 2025-02-20 RX ADMIN — METOPROLOL SUCCINATE 50 MG: 50 TABLET, EXTENDED RELEASE ORAL at 08:33

## 2025-02-20 RX ADMIN — SODIUM CHLORIDE, PRESERVATIVE FREE 10 ML: 5 INJECTION INTRAVENOUS at 08:34

## 2025-02-20 NOTE — PROGRESS NOTES
Internal Medicine Progress Note    Patient's name: Lorrie Burns  : 1955  Chief complaints (on day of admission): SVT  Admission date: 2025  Date of service: 2025   Room: [address]  Primary care physician: Daniella Mack DO  Reason for visit: Follow-up for Tachycardia    Subjective  Lorrie was seen and examined at bedside   Patient feels much better  HR in the 80s at rest, but with mild exertion seen to be upwards of 130  Pleasant and appears mildly anxious at baseline, but improved from day prior  Still having concerns of possible \"aphasia\" but able to speak more coherently now  Overnight had episode of confusion, patient did not remember this    Review of Systems  There are no new complaints of chest pain, shortness of breath, abdominal pain, nausea, vomiting, diarrhea, constipation unless otherwise mentioned above.     Hospital Medications  Current Facility-Administered Medications   Medication Dose Route Frequency Provider Last Rate Last Admin    hydrOXYzine HCl (ATARAX) tablet 25 mg  25 mg Oral TID PRN Landen Jensen MD   25 mg at 25    rosuvastatin (CRESTOR) tablet 5 mg  5 mg Oral Daily Landen Jensen MD   5 mg at 25    glucose chewable tablet 16 g  4 tablet Oral PRN Landen Jensen MD        dextrose bolus 10% 125 mL  125 mL IntraVENous PRN Landen Jensen MD        Or    dextrose bolus 10% 250 mL  250 mL IntraVENous PRN Landen Jensen MD        glucagon injection 1 mg  1 mg SubCUTAneous PRN Landen Jensen MD        dextrose 10 % infusion   IntraVENous Continuous PRLanden Thorne MD        mesalamine (DELZICOL) delayed release capsule 800 mg  800 mg Oral 4x Daily Miguel Morris APRN - CNP   800 mg at 25    predniSONE (DELTASONE) tablet 10 mg  10 mg Oral BID Miguel Morris APRN - CNP   10 mg at 25    flecainide (TAMBOCOR) tablet 50 mg  50 mg Oral BID Faith Quintero MD   50 mg at 25    metoprolol succinate (TOPROL XL)

## 2025-02-20 NOTE — PROGRESS NOTES
[facility]   Gastroenterology, Hepatology, &  Advanced Endoscopy    Progress Note      ASSESSMENT AND PLAN:    69y/F w/ Ulcerative Colitis who presents for endoscopic evaluation of abdominal pain and restaging of disease    EGD 2/18/25  Esophagus, Z-Line, stomach, duodenum, and cardia/gastric fundus were all normal.    Colonoscopy 2/18/25  Hemorrhoids were found on perianal exam.   Diffuse mil inflammation characterized by congestion and loss of vascularity, mild erythema/superficial erosion noted. No significantly active inflammation or ulceration appreciated.  During colonoscopy, procedure was terminated due to \"baseline and beta-blocker refractory tachycardia heart rate 120. Procedure was performed under close anesthesia surveillance and was incomplete in the setting of requiring abdominal pressure which worsened her heart rate and led to a vagal response. Due to sustained tachycardia procedure was aborted\"  consulted, appreciate input.   Persistent supraventricular tachycardia. Significant artifact on telemetry. Heart rates in the 140s and 150s. Status post hydration.   Metoprolol and resumed at 50 mg twice daily, staff will reach out to Dr. Barcenas with heart rate and BP.    PLAN: Doing well from GI POV, no exacerbation of MUC. Home medications resumed.  - Mesalamine 800 mg QID  - Resume Prednisone 10 mg BID  -Electrophysiology consulted, appreciate recs.     HISTORY OF PRESENT ILLNESS:      Ms. Lorrie Burns is a 69y/F w/ Ulcerative Colitis who presents for endoscopic evaluation of abdominal pain and restaging of disease.     Past Medical History:        Diagnosis Date    C. difficile colitis 2023    Encounter for screening colonoscopy     Hyperlipidemia     Hypertension     Memory loss     patient aphasic    Migraines     Vitamin D deficiency 11/11/2016     Past Surgical History:        Procedure Laterality Date    BREAST BIOPSY      BREAST REDUCTION SURGERY Bilateral 1986    BREAST SURGERY  Patient denies diffuse/irregular joint swelling or myalgias.     PHYSICAL EXAM:  /76   Pulse (!) 121   Temp 98.4 °F (36.9 °C) (Temporal)   Resp 18   Ht 1.575 m (5' 2\")   Wt 58.7 kg (129 lb 6.6 oz)   SpO2 99%   BMI 23.67 kg/m²   Physical Exam:  General: Overall well-appearing, NAD  HEENT: PERRLA, EOMI, Anicteric sclera, MMM, no rhinorrhea  Cards: RRR, no LE edema  Resp: Breathing comfortably on room air, good air movement, no use of accessory muscles, no audible wheezing  Abdomen: soft, NT, ND.   Extremities: Moves all extremities, no effusions or bruising.  Skin: No rashes or jaundice  Neuro: A&O x 3, CN grossly intact, non-focal exam       Greater than or equal to 35 minutes was spent providing face-to-face patient care, including:  and coordinating care, reviewing the chart, labs, and diagnostics, as well as medical decision making. Greater than 50% of this time was spent instructing and counseling the patient face to face regarding findings and recommendations.    Thank you for including us in the care of this patient. Please do not hesitate to contact us with any additional questions or concerns.    Case discussed and reviewed by Dr. Layton         Electronically signed by SELVIN Tucker CNP on 2/20/2025 at 8:24 AM

## 2025-02-20 NOTE — PROGRESS NOTES
This RN was called into pt room by 6401a for a \"clean up\", thinking 6401a went to the bathroom. However upon entering the room 6401b's  at the bedside told me that Lorrie went over to 6401a's bedside and sat on her chair and peed on it thinking it was the bathroom. Order for telesitter obtained since  is unable to redirect her. Will continue to monitor pt.    Dorina Al RN

## 2025-02-20 NOTE — PROGRESS NOTES
PROGRESS NOTE     CARDIOLOGY    Chief complaint: Seen today for follow up, management & recommendations for tachycardia with episode of vasovagal near syncope and pauses.    She denies chest pain or shortness of breath today.  Comfortable and in no distress today.    Wt Readings from Last 3 Encounters:   02/18/25 58.7 kg (129 lb 6.6 oz)   02/04/25 59 kg (130 lb)   01/28/25 59.9 kg (132 lb)     Temp Readings from Last 3 Encounters:   02/20/25 98.2 °F (36.8 °C) (Oral)   02/04/25 97 °F (36.1 °C) (Temporal)   01/28/25 97.3 °F (36.3 °C)     BP Readings from Last 3 Encounters:   02/20/25 106/64   02/04/25 124/70   01/28/25 110/80     Pulse Readings from Last 3 Encounters:   02/20/25 77   02/04/25 100   01/28/25 64         Intake/Output Summary (Last 24 hours) at 2/20/2025 1630  Last data filed at 2/19/2025 1802  Gross per 24 hour   Intake 480 ml   Output --   Net 480 ml       Recent Labs     02/18/25  1431 02/19/25  0502 02/20/25  0416   WBC 9.8 6.1 5.3   HGB 12.8 12.4 13.1   HCT 40.2 38.9 40.0   MCV 94.6 94.6 90.7    226 245     Recent Labs     02/18/25  1431 02/19/25  0502    140   K 4.2 4.3    108*   CO2 22 22   BUN 14 7   CREATININE 0.6 0.7   MG 1.9 2.0     No results for input(s): \"PROTIME\", \"INR\" in the last 72 hours.  No results for input(s): \"CKTOTAL\", \"CKMB\", \"CKMBINDEX\", \"TROPONINI\" in the last 72 hours.  No results for input(s): \"BNP\" in the last 72 hours.  No results for input(s): \"CHOL\", \"HDL\", \"TRIG\" in the last 72 hours.    Invalid input(s): \"CHOLHDLR\", \"LDLCALCU\"  No results for input(s): \"TROPHS\" in the last 72 hours.      hydrOXYzine HCl (ATARAX) tablet 25 mg, TID PRN  rosuvastatin (CRESTOR) tablet 5 mg, Daily  glucose chewable tablet 16 g, PRN  dextrose bolus 10% 125 mL, PRN   Or  dextrose bolus 10% 250 mL, PRN  glucagon injection 1 mg, PRN  dextrose 10 % infusion, Continuous PRN  mesalamine (DELZICOL) delayed release capsule 800 mg, 4x Daily  predniSONE (DELTASONE) tablet 10 mg,

## 2025-02-21 VITALS
DIASTOLIC BLOOD PRESSURE: 81 MMHG | RESPIRATION RATE: 18 BRPM | SYSTOLIC BLOOD PRESSURE: 122 MMHG | BODY MASS INDEX: 23.81 KG/M2 | HEIGHT: 62 IN | OXYGEN SATURATION: 98 % | WEIGHT: 129.41 LBS | HEART RATE: 70 BPM | TEMPERATURE: 97.5 F

## 2025-02-21 LAB
ANION GAP SERPL CALCULATED.3IONS-SCNC: 11 MMOL/L (ref 7–16)
BASOPHILS # BLD: 0.02 K/UL (ref 0–0.2)
BASOPHILS NFR BLD: 0 % (ref 0–2)
BUN SERPL-MCNC: 18 MG/DL (ref 6–23)
CALCIUM SERPL-MCNC: 9.1 MG/DL (ref 8.6–10.2)
CHLORIDE SERPL-SCNC: 106 MMOL/L (ref 98–107)
CO2 SERPL-SCNC: 22 MMOL/L (ref 22–29)
CREAT SERPL-MCNC: 0.7 MG/DL (ref 0.5–1)
EOSINOPHIL # BLD: 0 K/UL (ref 0.05–0.5)
EOSINOPHILS RELATIVE PERCENT: 0 % (ref 0–6)
ERYTHROCYTE [DISTWIDTH] IN BLOOD BY AUTOMATED COUNT: 13.3 % (ref 11.5–15)
GFR, ESTIMATED: >90 ML/MIN/1.73M2
GLUCOSE SERPL-MCNC: 138 MG/DL (ref 74–99)
HCT VFR BLD AUTO: 40.7 % (ref 34–48)
HGB BLD-MCNC: 12.8 G/DL (ref 11.5–15.5)
IMM GRANULOCYTES # BLD AUTO: 0.08 K/UL (ref 0–0.58)
IMM GRANULOCYTES NFR BLD: 1 % (ref 0–5)
LYMPHOCYTES NFR BLD: 1.1 K/UL (ref 1.5–4)
LYMPHOCYTES RELATIVE PERCENT: 15 % (ref 20–42)
MCH RBC QN AUTO: 30 PG (ref 26–35)
MCHC RBC AUTO-ENTMCNC: 31.4 G/DL (ref 32–34.5)
MCV RBC AUTO: 95.3 FL (ref 80–99.9)
MONOCYTES NFR BLD: 0.46 K/UL (ref 0.1–0.95)
MONOCYTES NFR BLD: 6 % (ref 2–12)
NEUTROPHILS NFR BLD: 78 % (ref 43–80)
NEUTS SEG NFR BLD: 5.74 K/UL (ref 1.8–7.3)
PLATELET # BLD AUTO: 222 K/UL (ref 130–450)
PMV BLD AUTO: 9.4 FL (ref 7–12)
POTASSIUM SERPL-SCNC: 4.8 MMOL/L (ref 3.5–5)
RBC # BLD AUTO: 4.27 M/UL (ref 3.5–5.5)
SODIUM SERPL-SCNC: 139 MMOL/L (ref 132–146)
WBC OTHER # BLD: 7.4 K/UL (ref 4.5–11.5)

## 2025-02-21 PROCEDURE — 6370000000 HC RX 637 (ALT 250 FOR IP): Performed by: STUDENT IN AN ORGANIZED HEALTH CARE EDUCATION/TRAINING PROGRAM

## 2025-02-21 PROCEDURE — G0378 HOSPITAL OBSERVATION PER HR: HCPCS

## 2025-02-21 PROCEDURE — 36415 COLL VENOUS BLD VENIPUNCTURE: CPT

## 2025-02-21 PROCEDURE — 6370000000 HC RX 637 (ALT 250 FOR IP): Performed by: NURSE PRACTITIONER

## 2025-02-21 PROCEDURE — 93005 ELECTROCARDIOGRAM TRACING: CPT | Performed by: STUDENT IN AN ORGANIZED HEALTH CARE EDUCATION/TRAINING PROGRAM

## 2025-02-21 PROCEDURE — 96372 THER/PROPH/DIAG INJ SC/IM: CPT

## 2025-02-21 PROCEDURE — 6360000002 HC RX W HCPCS: Performed by: STUDENT IN AN ORGANIZED HEALTH CARE EDUCATION/TRAINING PROGRAM

## 2025-02-21 PROCEDURE — 80048 BASIC METABOLIC PNL TOTAL CA: CPT

## 2025-02-21 PROCEDURE — 6360000002 HC RX W HCPCS: Performed by: NURSE PRACTITIONER

## 2025-02-21 PROCEDURE — 6370000000 HC RX 637 (ALT 250 FOR IP): Performed by: INTERNAL MEDICINE

## 2025-02-21 PROCEDURE — 99232 SBSQ HOSP IP/OBS MODERATE 35: CPT | Performed by: NURSE PRACTITIONER

## 2025-02-21 PROCEDURE — 93005 ELECTROCARDIOGRAM TRACING: CPT | Performed by: INTERNAL MEDICINE

## 2025-02-21 PROCEDURE — 2500000003 HC RX 250 WO HCPCS: Performed by: STUDENT IN AN ORGANIZED HEALTH CARE EDUCATION/TRAINING PROGRAM

## 2025-02-21 PROCEDURE — 85025 COMPLETE CBC W/AUTO DIFF WBC: CPT

## 2025-02-21 PROCEDURE — 2500000003 HC RX 250 WO HCPCS: Performed by: NURSE PRACTITIONER

## 2025-02-21 RX ORDER — MESALAMINE 400 MG/1
1600 CAPSULE, DELAYED RELEASE ORAL 3 TIMES DAILY
Status: DISCONTINUED | OUTPATIENT
Start: 2025-02-21 | End: 2025-02-21 | Stop reason: HOSPADM

## 2025-02-21 RX ORDER — CHOLESTYRAMINE 4 G/9G
1 POWDER, FOR SUSPENSION ORAL 2 TIMES DAILY PRN
Status: DISCONTINUED | OUTPATIENT
Start: 2025-02-21 | End: 2025-02-21 | Stop reason: HOSPADM

## 2025-02-21 RX ORDER — FLECAINIDE ACETATE 50 MG/1
50 TABLET ORAL 2 TIMES DAILY
Qty: 180 TABLET | Refills: 1 | Status: SHIPPED | OUTPATIENT
Start: 2025-02-21

## 2025-02-21 RX ORDER — METOPROLOL SUCCINATE 50 MG/1
50 TABLET, EXTENDED RELEASE ORAL DAILY
Qty: 90 TABLET | Refills: 3 | Status: SHIPPED | OUTPATIENT
Start: 2025-02-21

## 2025-02-21 RX ADMIN — METOPROLOL SUCCINATE 50 MG: 50 TABLET, EXTENDED RELEASE ORAL at 08:12

## 2025-02-21 RX ADMIN — FLECAINIDE ACETATE 50 MG: 50 TABLET ORAL at 05:47

## 2025-02-21 RX ADMIN — SODIUM CHLORIDE, PRESERVATIVE FREE 10 ML: 5 INJECTION INTRAVENOUS at 08:12

## 2025-02-21 RX ADMIN — PREDNISONE 10 MG: 5 TABLET ORAL at 08:12

## 2025-02-21 RX ADMIN — ENOXAPARIN SODIUM 40 MG: 100 INJECTION SUBCUTANEOUS at 08:23

## 2025-02-21 RX ADMIN — ZIPRASIDONE MESYLATE 10 MG: 20 INJECTION, POWDER, LYOPHILIZED, FOR SOLUTION INTRAMUSCULAR at 02:27

## 2025-02-21 RX ADMIN — MESALAMINE 1600 MG: 400 CAPSULE, DELAYED RELEASE ORAL at 08:12

## 2025-02-21 ASSESSMENT — PAIN SCALES - GENERAL: PAINLEVEL_OUTOF10: 0

## 2025-02-21 NOTE — CARE COORDINATION
2/21:  Update CM Note:  Pt presented to the ER for abdominal pain from home.  Pt is a possible dc today once seen by EP.  Pt's dc plan is home & her  can transport.  Will need to verify cost of any new medications.  Sw/TONIA will continue to follow.  Electronically signed by Frances Virk RN on 2/21/2025 at 12:56 PM

## 2025-02-21 NOTE — PROGRESS NOTES
[facility] PHYSICIANS- The Heart and Vascular [place] [place] Electrophysiology  Consultation Report  PATIENT: Lorrie Burns  MEDICAL RECORD NUMBER: [id number]  DATE OF SERVICE:  2/20/2025  ATTENDING ELECTROPHYSIOLOGIST: Faith Quintero MD  PRIMARY ELECTROPHYSIOLOGIST: Faith Quintero MD  REFERRING PHYSICIAN: No ref. provider found and Daniella Mack DO  CHIEF COMPLAINT: Diarrhea x 5-6 weeks here for a scheduled upper and lower endoscopy    HPI: This is a 69 y.o. female with a history of aphasia, generalized anxiety disorder, ulcerative colitis, PSVT. Patient is known to  of [facility]. Patient isn't known to [facility].    Her PSVT was noted first during admission 1/2023 in the setting of C diff pancolitis/ulcerative colitis flare. CT imaging showed diffuse colonic flare. There was spontaneous resolution of PSVT.  saw the patient at that time and performed an echocardiogram which showed normal LVEF and a trivial pericardial effusion.  A 14 day Zio was placed 2/2023 that did show SVT and NSVT.  Patient was ordered Lopressor 50mg BID. Patient reports she's taking Metoprolol 25mg BID at home, Mesalamine QID , Prednisone 10mg BID, and Crestor.    She was evaluated at [facility] for  PSVT and NSVT around 2/2023. They didn't have much more to offer other than staying on beta blocker and obtaining a [employer] Mobile, which she has not done.    Patient presented to [facility] on 2/18/25 as an outpatient for a scheduled upper and lower endoscopy due to ongoing diarrhea for 5-6weeks. Saw  who scheduled the procedure for 2/18/25. Cardiology was initially consulted for Sinus tachycardia/SVT.  EGD was WNL.  Her colonoscopy could not be completed due to persistent tachycardia.     saw patient and ordered Adenosine 6mg which was only effective for about 10 seconds. He also ordered to give an extra dose of Metoprolol XL 50mg without effectiveness. Her Metoprolol dose was increased to 100mg  BID, with first dose being given this evening. Bedside nursing staff states HR jumps up with ambulation to the 150-160's. Patient denies any symptoms of dizziness, heart racing, fluttering, palpitations, SOB, near syncope. Has had two syncopal episodes in the past where she hit her head, that was further evaluated with CT brain which was unremarkable per her .  I attempted to speak to the patient tonight but due to confusion she could not give me any appropriate answers or significant medical history.  I therefore spoke to her  on the telephone and the history is as noted above.    Cardiac electrophysiology service is consulted for Sinus tachycardia versus SVT.    2/20/25: Patient converted to sinus rhythm overnight and has maintained sinus rhythm.  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 difficile colitis 2023    Encounter for screening colonoscopy     Hyperlipidemia     Hypertension     Memory loss     patient aphasic    Migraines     Vitamin D deficiency 11/11/2016       Family History   Problem Relation Age of Onset    Breast Cancer Sister 69    Breast Cancer Paternal Aunt 70    Lung Cancer Paternal Uncle     Lung Cancer Paternal Uncle

## 2025-02-21 NOTE — PROGRESS NOTES
EKG completed and transmitted to chart.  Dr. Jensen notified.   Andrei Hicks notified.   Sophia Herron RN

## 2025-02-21 NOTE — PROGRESS NOTES
[facility] PHYSICIANS- The Heart and Vascular [place] [place] Electrophysiology  Consultation Report  PATIENT: Lorrie Burns  MEDICAL RECORD NUMBER: [id number]  DATE OF SERVICE:  2/21/2025  ATTENDING ELECTROPHYSIOLOGIST: Faith Quintero MD  PRIMARY ELECTROPHYSIOLOGIST: Faith Quintero MD  REFERRING PHYSICIAN: No ref. provider found and Daniella Mack DO  CHIEF COMPLAINT: Diarrhea x 5-6 weeks here for a scheduled upper and lower endoscopy    HPI: This is a 69 y.o. female with a history of aphasia, generalized anxiety disorder, ulcerative colitis, PSVT. Patient is known to  of [facility]. Patient isn't known to [facility].    Her PSVT was noted first during admission 1/2023 in the setting of C diff pancolitis/ulcerative colitis flare. CT imaging showed diffuse colonic flare. There was spontaneous resolution of PSVT.  saw the patient at that time and performed an echocardiogram which showed normal LVEF and a trivial pericardial effusion.  A 14 day Zio was placed 2/2023 that did show SVT and NSVT.  Patient was ordered Lopressor 50mg BID. Patient reports she's taking Metoprolol 25mg BID at home, Mesalamine QID , Prednisone 10mg BID, and Crestor.    She was evaluated at [facility] for  PSVT and NSVT around 2/2023. They didn't have much more to offer other than staying on beta blocker and obtaining a K[employer]ia Mobile, which she has not done.    Patient presented to [facility] on 2/18/25 as an outpatient for a scheduled upper and lower endoscopy due to ongoing diarrhea for 5-6weeks. Saw  who scheduled the procedure for 2/18/25. Cardiology was initially consulted for Sinus tachycardia/SVT.  EGD was WNL.  Her colonoscopy could not be completed due to persistent tachycardia.     saw patient and ordered Adenosine 6mg which was only effective for about 10 seconds. He also ordered to give an extra dose of Metoprolol XL 50mg without effectiveness. Her Metoprolol dose was increased to 100mg  circumferential pericardial effusion noted.       Outpatient Monitor: 3/6/23      I have independently reviewed all of the ECGs and rhythm strips per above     Assessment/Plan: This is a 69 y.o. female with a history of     1. SVT  -Initially first seen 2 years ago when hospitalized at [facility] for CDIFF pancolitis . Had episodes of PSVT and  evaluated patient. Her episodes resolved spontaneously during that admission.  -was evaluated at [facility] for SVT 5/11/23- they reported Zio was not particularly remarkable and offered no other recommendations other rthan obtaining a [employer] Mobile and staying on beta blocker, briefly touched on ablation therapy.  -On Metoprolol 25mg BID---> Has been increased to 100mg BID  starting this evening  -Received Adenosine 6mg x 1 today, which did terminate tachycardia transiently.  Rhythm strip suggests SVT which did terminate transiently with IV adenosine    Patient now on flecainide with maintenance of sinus rhythm as noted above    2. Aphasia from unknown source  -Denies history of CVA  MRI brain x 2 have shown no etiology for her aphasia.  Had blunt injury to her head as a child x 2 occasions.  Vasovagal syncope x2 where she hit her head both times(CT head negative per )    3. Generalized anxiety disorder  -Takes Vistaril TID PRN    4. Ulcerative Colitis  - Has been on prednisone for the last month due to diarrhea x 5-6 weeks  -Continues to be on Prednisone 10mg BID  -EGD normal  -Colonoscopy showed hemorroids on perianal exam, diffuse inflammation characterized by congestion and loss of vascularity, mild erythema/superficial erosion noted. No significantly active inflammation or ulceration appreciated.   \"During colonoscopy, procedure was terminated due to \"baseline and beta-blocker refractory tachycardia heart rate 120. Procedure was performed under close anesthesia surveillance and was incomplete in the setting of requiring abdominal pressure which worsened her heart

## 2025-02-21 NOTE — CONSULTS
Gastroenterology, Hepatology, &  Advanced Endoscopy    Consult Note      Reason for Consult: Ulcerative Colitis     HPI:   Lorrie Burns is a 69 y.o. female w/ PMH of  has a past medical history of C. difficile colitis, Encounter for screening colonoscopy, Hyperlipidemia, Hypertension, Memory loss, Migraines, and Vitamin D deficiency. who is admitted following endoscopic evaluation for beta-blocker resistant tachycardia.     She has a history of Ulcerative Colitis maintained on mesalamine with recent flare resulting in increased dose of mesalamine and prednisone taper. She presented for endoscopic evaluation given persistent diarrhea and abdominal pain.     Pre-operatively, she was found to be in persistent tachycardia to HR 120s despite taking her daily metoprolol. She remained tachycardic despite additional doses of metoprolol during her procedure.     EGD was normal and Colonoscopy showed diffuse edema and intermittent mild superficial ulceration most consistent with healing acute inflammation. The procedure was brief and terminated early due to tachycardia and ongoing concern from anesthesia.       Recent Labs     02/18/25  1431   ALT 19   AST 18   ALKPHOS 52   BILITOT 0.4     Lab Results   Component Value Date    WBC 7.4 02/21/2025    HGB 12.8 02/21/2025    HCT 40.7 02/21/2025     02/21/2025     02/19/2025    K 4.3 02/19/2025     (H) 02/19/2025    CREATININE 0.7 02/19/2025    BUN 7 02/19/2025    CO2 22 02/19/2025    FOLATE >20.0 03/22/2024    [id number] 756 03/22/2024    GLUCOSE 63 (L) 02/19/2025    TSH 3.15 02/18/2025        Sed Rate, Automated   Date Value Ref Range Status   01/09/2025 63 (H) 0 - 20 mm/Hr Final   01/03/2025 70 (H) 0 - 20 mm/Hr Final   03/22/2024 17 0 - 20 mm/Hr Final   07/18/2023 21 (H) 0 - 20 mm/Hr Final   11/16/2022 33 (H) 0 - 20 mm/Hr Final     Lipase   Date Value Ref Range Status   01/06/2025 23 13 - 60 U/L Final   11/10/2024 31 13 - 60 U/L Final

## 2025-02-21 NOTE — PROGRESS NOTES
Patient began to become agitated and confused at 1:45am. Her  was at bedside and patient was getting out of bed trying to leave the hospital. She was wanting to get her dog. Patient pushed  and pushed RN to get to door. Patient attempted to hit staff with heart monitor. Patient was moved to a room closer to nurses station and was provided with a telesitter. Patient began digging her nails in to staff arms, and trying to kick. Patient was unable to be redirected. Bilateral soft wrist restraints were applied. Patients  was present and educated on restraints and video safety monitor. Patient was trying to roll out of bed and get out of restraints and began trying to kick staff when assistance was provided. Notified NP Rocío Reddy, and one time order for Geodon IM was placed and given at 0227am. Patient currently still confused, yelling out for help, claiming people are killing her dog, and trying to go home. Redirection attempts made.

## 2025-02-21 NOTE — PROGRESS NOTES
[facility]   Gastroenterology, Hepatology, &  Advanced Endoscopy    Progress Note    Subjective: No diarrhea over last 2-3 days. Denies hematochezia or melena. Feels well.    ASSESSMENT AND PLAN:    69y/F w/ Ulcerative Colitis who presents for endoscopic evaluation of abdominal pain and restaging of disease    EGD 2/18/25  Esophagus, Z-Line, stomach, duodenum, and cardia/gastric fundus were all normal.    Colonoscopy 2/18/25  Hemorrhoids were found on perianal exam.   Diffuse mil inflammation characterized by congestion and loss of vascularity, mild erythema/superficial erosion noted. No significantly active inflammation or ulceration appreciated.  During colonoscopy, procedure was terminated due to \"baseline and beta-blocker refractory tachycardia heart rate 120. Procedure was performed under close anesthesia surveillance and was incomplete in the setting of requiring abdominal pressure which worsened her heart rate and led to a vagal response. Due to sustained tachycardia procedure was aborted\"  consulted, appreciate input.   Persistent supraventricular tachycardia. Significant artifact on telemetry. Heart rates in the 140s and 150s. Status post hydration.   Metoprolol and resumed at 50 mg twice daily, staff will reach out to Dr. Barcenas with heart rate and BP.    PLAN: Doing well from GI POV, no exacerbation of MUC. Home medications resumed.  - Mesalamine 800 mg QID  - Resume Prednisone 10 mg BID  -Electrophysiology consulted, appreciate recs.     HISTORY OF PRESENT ILLNESS:      Ms. Lorrie Burns is a 69y/F w/ Ulcerative Colitis who presents for endoscopic evaluation of abdominal pain and restaging of disease.     Past Medical History:        Diagnosis Date    C. difficile colitis 2023    Encounter for screening colonoscopy     Hyperlipidemia     Hypertension     Memory loss     patient aphasic    Migraines     Vitamin D deficiency 11/11/2016     Past Surgical History:        Procedure

## 2025-02-22 LAB
EKG ATRIAL RATE: 129 BPM
EKG ATRIAL RATE: 136 BPM
EKG ATRIAL RATE: 143 BPM
EKG ATRIAL RATE: 64 BPM
EKG P AXIS: 17 DEGREES
EKG P-R INTERVAL: 154 MS
EKG P-R INTERVAL: 154 MS
EKG P-R INTERVAL: 174 MS
EKG Q-T INTERVAL: 246 MS
EKG Q-T INTERVAL: 270 MS
EKG Q-T INTERVAL: 384 MS
EKG Q-T INTERVAL: 414 MS
EKG QRS DURATION: 72 MS
EKG QRS DURATION: 74 MS
EKG QRS DURATION: 82 MS
EKG QRS DURATION: 90 MS
EKG QTC CALCULATION (BAZETT): 379 MS
EKG QTC CALCULATION (BAZETT): 395 MS
EKG QTC CALCULATION (BAZETT): 427 MS
EKG QTC CALCULATION (BAZETT): 576 MS
EKG R AXIS: -22 DEGREES
EKG R AXIS: -32 DEGREES
EKG R AXIS: -43 DEGREES
EKG R AXIS: -49 DEGREES
EKG T AXIS: -19 DEGREES
EKG T AXIS: -36 DEGREES
EKG T AXIS: -83 DEGREES
EKG T AXIS: 0 DEGREES
EKG VENTRICULAR RATE: 129 BPM
EKG VENTRICULAR RATE: 135 BPM
EKG VENTRICULAR RATE: 143 BPM
EKG VENTRICULAR RATE: 64 BPM

## 2025-02-22 PROCEDURE — 93010 ELECTROCARDIOGRAM REPORT: CPT | Performed by: INTERNAL MEDICINE

## 2025-02-24 DIAGNOSIS — F41.1 GENERALIZED ANXIETY DISORDER: ICD-10-CM

## 2025-02-24 RX ORDER — HYDROXYZINE PAMOATE 25 MG/1
CAPSULE ORAL
Qty: 90 CAPSULE | Refills: 0 | Status: SHIPPED
Start: 2025-02-24 | End: 2025-02-25 | Stop reason: ALTCHOICE

## 2025-02-25 ENCOUNTER — OFFICE VISIT (OUTPATIENT)
Dept: PRIMARY CARE CLINIC | Age: 70
End: 2025-02-25
Payer: MEDICARE

## 2025-02-25 VITALS
SYSTOLIC BLOOD PRESSURE: 112 MMHG | WEIGHT: 127 LBS | OXYGEN SATURATION: 95 % | HEIGHT: 62 IN | BODY MASS INDEX: 23.37 KG/M2 | DIASTOLIC BLOOD PRESSURE: 70 MMHG | TEMPERATURE: 97.8 F | HEART RATE: 63 BPM

## 2025-02-25 DIAGNOSIS — K51.00 ULCERATIVE PANCOLITIS WITHOUT COMPLICATION (HCC): ICD-10-CM

## 2025-02-25 DIAGNOSIS — F41.1 GENERALIZED ANXIETY DISORDER: Primary | ICD-10-CM

## 2025-02-25 DIAGNOSIS — I47.10 SUPRAVENTRICULAR TACHYCARDIA: ICD-10-CM

## 2025-02-25 PROBLEM — R55 SYNCOPE: Status: RESOLVED | Noted: 2025-02-18 | Resolved: 2025-02-25

## 2025-02-25 PROBLEM — R10.12 LEFT UPPER QUADRANT ABDOMINAL PAIN: Status: RESOLVED | Noted: 2025-02-04 | Resolved: 2025-02-25

## 2025-02-25 PROBLEM — T88.1XXA POST-IMMUNIZATION REACTION: Status: RESOLVED | Noted: 2023-06-12 | Resolved: 2025-02-25

## 2025-02-25 PROCEDURE — 99214 OFFICE O/P EST MOD 30 MIN: CPT | Performed by: FAMILY MEDICINE

## 2025-02-25 PROCEDURE — G2211 COMPLEX E/M VISIT ADD ON: HCPCS | Performed by: FAMILY MEDICINE

## 2025-02-25 PROCEDURE — 1159F MED LIST DOCD IN RCRD: CPT | Performed by: FAMILY MEDICINE

## 2025-02-25 PROCEDURE — 1160F RVW MEDS BY RX/DR IN RCRD: CPT | Performed by: FAMILY MEDICINE

## 2025-02-25 PROCEDURE — 1123F ACP DISCUSS/DSCN MKR DOCD: CPT | Performed by: FAMILY MEDICINE

## 2025-02-25 RX ORDER — DESVENLAFAXINE 50 MG/1
50 TABLET, FILM COATED, EXTENDED RELEASE ORAL NIGHTLY
Qty: 30 TABLET | Refills: 2 | Status: SHIPPED | OUTPATIENT
Start: 2025-02-25

## 2025-02-25 ASSESSMENT — ENCOUNTER SYMPTOMS
BACK PAIN: 0
ABDOMINAL PAIN: 0
COUGH: 0
VOMITING: 0
CONSTIPATION: 0
SHORTNESS OF BREATH: 0
NAUSEA: 0
WHEEZING: 0
DIARRHEA: 0

## 2025-02-25 NOTE — ASSESSMENT & PLAN NOTE
noting improvement overall, but hard to say if medication is fully effective or not due to recent hospitalization.  Will increase to 50 mg.  Did not tolerate Vistaril while in the hospital, so hasn't used as OP    Orders:    desvenlafaxine succinate (PRISTIQ) 50 MG TB24 extended release tablet; Take 1 tablet by mouth at bedtime

## 2025-02-25 NOTE — PROGRESS NOTES
25  Lorrie Burns : 1955 Sex: female  Age: 69 y.o.    Chief Complaint   Patient presents with    Anxiety     Vistaril doesn't seem to calm her hasn't taken a lot - pristiq seems to be helping feels like she is sleeping better at night      HPI:  69 y.o. female presents today for 4 week follow up of chronic medical conditions, medication refills.  Patient's chart, medical, surgical and medication history all reviewed.    Dementia  History determined based on chart documentation, 's recollection.  According to chart records, patient underwent COVID19 vaccination in 2021 and developed significant neurologic issues.  She has expressive aphasia, difficulty with word finding  and difficulty with reading and writing.  Has been seen by Neuro and has had speech therapy, multiple scans, numerous types of blood work, etc.   states that condition worsened after her hospitalization in 2023 for UC flare up.  He states that she is up 3-4 times at night to utilize the restroom, her hair is falling out, increasing GERD symptoms, dizzy with change in position, and pain under rib cage.  She has had several concussions in her lifetime where she was \"knocked out\".  Document from  suggests that patient remembers one at 2-3 yo from a polly totter, 7-7 yo from a swing, 35 yo with syncopal even at work, and 43 yo with syncopal event.  She has been seen by Dr. Meek who reviewed everything and did not feel there was any acute neurologic changes on scans.  He was more concerned about a possible psuedodementia related to severe anxiety.   He started her on low dose Effexor and referred to to ST.   Patient's  states that when they went to the [facility] for therapy, they had a poor experience and would like to go elsewhere.     Anxiety  Patient complains of evaluation of anxiety disorder.  She has the following anxiety symptoms: difficulty concentrating, fatigue, insomnia,

## 2025-02-26 ENCOUNTER — OFFICE VISIT (OUTPATIENT)
Dept: GASTROENTEROLOGY | Age: 70
End: 2025-02-26
Payer: MEDICARE

## 2025-02-26 VITALS
HEART RATE: 55 BPM | DIASTOLIC BLOOD PRESSURE: 70 MMHG | WEIGHT: 129.6 LBS | BODY MASS INDEX: 23.85 KG/M2 | RESPIRATION RATE: 18 BRPM | OXYGEN SATURATION: 98 % | SYSTOLIC BLOOD PRESSURE: 119 MMHG | HEIGHT: 62 IN | TEMPERATURE: 97.5 F

## 2025-02-26 DIAGNOSIS — K51.00 ULCERATIVE PANCOLITIS WITHOUT COMPLICATION (HCC): ICD-10-CM

## 2025-02-26 DIAGNOSIS — I47.10 SUPRAVENTRICULAR TACHYCARDIA: Primary | ICD-10-CM

## 2025-02-26 PROCEDURE — 1123F ACP DISCUSS/DSCN MKR DOCD: CPT | Performed by: STUDENT IN AN ORGANIZED HEALTH CARE EDUCATION/TRAINING PROGRAM

## 2025-02-26 PROCEDURE — G8427 DOCREV CUR MEDS BY ELIG CLIN: HCPCS | Performed by: STUDENT IN AN ORGANIZED HEALTH CARE EDUCATION/TRAINING PROGRAM

## 2025-02-26 PROCEDURE — 99214 OFFICE O/P EST MOD 30 MIN: CPT | Performed by: STUDENT IN AN ORGANIZED HEALTH CARE EDUCATION/TRAINING PROGRAM

## 2025-02-26 PROCEDURE — 3017F COLORECTAL CA SCREEN DOC REV: CPT | Performed by: STUDENT IN AN ORGANIZED HEALTH CARE EDUCATION/TRAINING PROGRAM

## 2025-02-26 PROCEDURE — G8420 CALC BMI NORM PARAMETERS: HCPCS | Performed by: STUDENT IN AN ORGANIZED HEALTH CARE EDUCATION/TRAINING PROGRAM

## 2025-02-26 PROCEDURE — 1036F TOBACCO NON-USER: CPT | Performed by: STUDENT IN AN ORGANIZED HEALTH CARE EDUCATION/TRAINING PROGRAM

## 2025-02-26 PROCEDURE — 1090F PRES/ABSN URINE INCON ASSESS: CPT | Performed by: STUDENT IN AN ORGANIZED HEALTH CARE EDUCATION/TRAINING PROGRAM

## 2025-02-26 PROCEDURE — G8399 PT W/DXA RESULTS DOCUMENT: HCPCS | Performed by: STUDENT IN AN ORGANIZED HEALTH CARE EDUCATION/TRAINING PROGRAM

## 2025-02-26 PROCEDURE — 1159F MED LIST DOCD IN RCRD: CPT | Performed by: STUDENT IN AN ORGANIZED HEALTH CARE EDUCATION/TRAINING PROGRAM

## 2025-02-26 NOTE — PROGRESS NOTES
Gastroenterology, Hepatology, &  Advanced Endoscopy    Progress Note        HPI:     Ms. Lorrie Burns is a 70y/F w/ history of Ulcerative Colitis maintained on mesalamine who presents to clinic in follow-up. She was recently evaluated with a colonoscopy after she began having persistent abdominal discomfort and diarrhea consistent with UC flare. She was placed on steroids and her mesalamine was increased to 4.8g. When she arrived for colonoscopy, she was noted to be in a persistent narrow complex tachycardia. She reported taking her beta blockers that morning. She did not respond to further beta blocker administration, fluids, or pain/anxiety medication.     EGD/Colonoscopy were performed quickly due her persistent tachycardia. Her colon had the overall appearance of improving active colitis with diffuse congestion and erythema appreciated. No significantly active inflammation or ulceration was appreciated.     She was admitted following her colonoscopy and was evaluated by EP. The patient was started on flecainide which she has responded well to. Hospitalization was c/b agitation and sundowning. She also reports that her GI symptoms are largely controlled on 4.8g mesalamine daily.     Colonoscopy 2/18/2025  Hemorrhoids found on perianal exam  Diffuse mild inflammation was found in the examined colon.  Congestion, decreased vascularity, and mild erythema/superficial erosion noted.  No significantly active inflammation or ulceration appreciated  Procedure was terminated early due to baseline and beta-blocker refractory tachycardia    EGD 2/18/2025  Normal esophagus  Z-line regular  Normal stomach  Normal examined duodenum  No specimens collected    No results for input(s): \"INR\", \"ALT\", \"AST\", \"ALKPHOS\", \"BILITOT\", \"GLOB\", \"GGT\", \"LABPROT\", \"LABALBU\", \"BILIDIR\" in the last 72 hours.  Lab Results   Component Value Date    WBC 7.4 02/21/2025    HGB 12.8 02/21/2025    HCT 40.7 02/21/2025     02/21/2025

## 2025-03-24 SDOH — HEALTH STABILITY: PHYSICAL HEALTH: ON AVERAGE, HOW MANY MINUTES DO YOU ENGAGE IN EXERCISE AT THIS LEVEL?: 30 MIN

## 2025-03-24 SDOH — HEALTH STABILITY: PHYSICAL HEALTH: ON AVERAGE, HOW MANY DAYS PER WEEK DO YOU ENGAGE IN MODERATE TO STRENUOUS EXERCISE (LIKE A BRISK WALK)?: 3 DAYS

## 2025-03-24 ASSESSMENT — PATIENT HEALTH QUESTIONNAIRE - PHQ9
4. FEELING TIRED OR HAVING LITTLE ENERGY: NOT AT ALL
SUM OF ALL RESPONSES TO PHQ QUESTIONS 1-9: 3
9. THOUGHTS THAT YOU WOULD BE BETTER OFF DEAD, OR OF HURTING YOURSELF: NOT AT ALL
SUM OF ALL RESPONSES TO PHQ QUESTIONS 1-9: 3
6. FEELING BAD ABOUT YOURSELF - OR THAT YOU ARE A FAILURE OR HAVE LET YOURSELF OR YOUR FAMILY DOWN: NOT AT ALL
3. TROUBLE FALLING OR STAYING ASLEEP: NOT AT ALL
2. FEELING DOWN, DEPRESSED OR HOPELESS: SEVERAL DAYS
8. MOVING OR SPEAKING SO SLOWLY THAT OTHER PEOPLE COULD HAVE NOTICED. OR THE OPPOSITE, BEING SO FIGETY OR RESTLESS THAT YOU HAVE BEEN MOVING AROUND A LOT MORE THAN USUAL: NOT AT ALL
10. IF YOU CHECKED OFF ANY PROBLEMS, HOW DIFFICULT HAVE THESE PROBLEMS MADE IT FOR YOU TO DO YOUR WORK, TAKE CARE OF THINGS AT HOME, OR GET ALONG WITH OTHER PEOPLE: NOT DIFFICULT AT ALL
1. LITTLE INTEREST OR PLEASURE IN DOING THINGS: SEVERAL DAYS
5. POOR APPETITE OR OVEREATING: SEVERAL DAYS
7. TROUBLE CONCENTRATING ON THINGS, SUCH AS READING THE NEWSPAPER OR WATCHING TELEVISION: NOT AT ALL

## 2025-03-24 ASSESSMENT — LIFESTYLE VARIABLES
HOW MANY STANDARD DRINKS CONTAINING ALCOHOL DO YOU HAVE ON A TYPICAL DAY: PATIENT DOES NOT DRINK
HOW OFTEN DO YOU HAVE A DRINK CONTAINING ALCOHOL: NEVER
HOW MANY STANDARD DRINKS CONTAINING ALCOHOL DO YOU HAVE ON A TYPICAL DAY: 0
HOW OFTEN DO YOU HAVE SIX OR MORE DRINKS ON ONE OCCASION: 1
HOW OFTEN DO YOU HAVE A DRINK CONTAINING ALCOHOL: 1

## 2025-03-25 ENCOUNTER — OFFICE VISIT (OUTPATIENT)
Dept: PRIMARY CARE CLINIC | Age: 70
End: 2025-03-25
Payer: MEDICARE

## 2025-03-25 VITALS
BODY MASS INDEX: 24.11 KG/M2 | HEIGHT: 62 IN | SYSTOLIC BLOOD PRESSURE: 108 MMHG | DIASTOLIC BLOOD PRESSURE: 60 MMHG | OXYGEN SATURATION: 97 % | WEIGHT: 131 LBS | HEART RATE: 58 BPM | TEMPERATURE: 97.5 F

## 2025-03-25 DIAGNOSIS — F41.1 GENERALIZED ANXIETY DISORDER: ICD-10-CM

## 2025-03-25 DIAGNOSIS — Z00.00 MEDICARE ANNUAL WELLNESS VISIT, SUBSEQUENT: Primary | ICD-10-CM

## 2025-03-25 DIAGNOSIS — Z12.31 ENCOUNTER FOR SCREENING MAMMOGRAM FOR MALIGNANT NEOPLASM OF BREAST: ICD-10-CM

## 2025-03-25 PROCEDURE — 1123F ACP DISCUSS/DSCN MKR DOCD: CPT | Performed by: FAMILY MEDICINE

## 2025-03-25 PROCEDURE — 1159F MED LIST DOCD IN RCRD: CPT | Performed by: FAMILY MEDICINE

## 2025-03-25 PROCEDURE — 1160F RVW MEDS BY RX/DR IN RCRD: CPT | Performed by: FAMILY MEDICINE

## 2025-03-25 PROCEDURE — G0439 PPPS, SUBSEQ VISIT: HCPCS | Performed by: FAMILY MEDICINE

## 2025-03-25 RX ORDER — DESVENLAFAXINE 50 MG/1
50 TABLET, FILM COATED, EXTENDED RELEASE ORAL NIGHTLY
Qty: 90 TABLET | Refills: 1 | Status: SHIPPED | OUTPATIENT
Start: 2025-03-25

## 2025-03-25 NOTE — PROGRESS NOTES
Prior to Visit Medications    Medication Sig Taking? Authorizing Provider   desvenlafaxine succinate (PRISTIQ) 50 MG TB24 extended release tablet Take 1 tablet by mouth at bedtime Yes Daniella Mack DO   flecainide (TAMBOCOR) 50 MG tablet Take 1 tablet by mouth 2 times daily Yes Faith Quintero MD   metoprolol succinate (TOPROL XL) 50 MG extended release tablet Take 1 tablet by mouth daily Yes Faith Quintero MD   mesalamine (LIALDA) 1.2 g EC tablet Take 4 tablets by mouth daily (with breakfast) Yes Tone Layton MD   Calcium Polycarbophil (FIBER) 625 MG TABS Take 1 tablet by mouth in the morning and at bedtime Yes Tone Layton MD   colesevelam (WELCHOL) 625 MG tablet Take 1 tablet by mouth 2 times daily (with meals) Yes Tone Layton MD   vitamin D (ERGOCALCIFEROL) 1.25 MG (25021 UT) CAPS capsule Take 1 capsule by mouth Once a week at 5 PM Yes Tray Golden DO   rosuvastatin (CRESTOR) 5 MG tablet Take 1 tablet by mouth daily Yes Tray Golden DO   Multiple Vitamins-Minerals (MULTIVITAMIN ADULT PO) Take 1 tablet by mouth daily Yes Provider, MD Ko Anguiano (Including outside providers/suppliers regularly involved in providing care):   Patient Care Team:  Daniella Mack DO as PCP - General (Family Medicine)  Daniella Mack DO as PCP - Empaneled Provider     Recommendations for Preventive Services Due: see orders and patient instructions/AVS.  Recommended screening schedule for the next 5-10 years is provided to the patient in written form: see Patient Instructions/AVS.     Reviewed and updated this visit:  Tobacco  Allergies  Meds  Problems  Med Hx  Surg Hx  Fam Hx  Sexual   Hx

## 2025-03-25 NOTE — PATIENT INSTRUCTIONS
Personalized Preventive Plan for Lorrie Burns - 3/25/2025  Medicare offers a range of preventive health benefits. Some of the tests and screenings are paid in full while other may be subject to a deductible, co-insurance, and/or copay.  Some of these benefits include a comprehensive review of your medical history including lifestyle, illnesses that may run in your family, and various assessments and screenings as appropriate.  After reviewing your medical record and screening and assessments performed today your provider may have ordered immunizations, labs, imaging, and/or referrals for you.  A list of these orders (if applicable) as well as your Preventive Care list are included within your After Visit Summary for your review.

## 2025-04-01 RX ORDER — ROSUVASTATIN CALCIUM 5 MG/1
5 TABLET, COATED ORAL DAILY
Qty: 90 TABLET | Refills: 1 | Status: SHIPPED | OUTPATIENT
Start: 2025-04-01

## 2025-04-01 RX ORDER — ERGOCALCIFEROL 1.25 MG/1
CAPSULE, LIQUID FILLED ORAL
Qty: 12 CAPSULE | Refills: 1 | Status: SHIPPED | OUTPATIENT
Start: 2025-04-01

## 2025-04-29 ENCOUNTER — HOSPITAL ENCOUNTER (OUTPATIENT)
Dept: MAMMOGRAPHY | Age: 70
Discharge: HOME | End: 2025-05-01
Payer: MEDICARE

## 2025-04-29 DIAGNOSIS — Z12.31 ENCOUNTER FOR SCREENING MAMMOGRAM FOR MALIGNANT NEOPLASM OF BREAST: ICD-10-CM

## 2025-04-29 PROCEDURE — 77063 BREAST TOMOSYNTHESIS BI: CPT

## 2025-04-30 ENCOUNTER — RESULTS FOLLOW-UP (OUTPATIENT)
Dept: PRIMARY CARE CLINIC | Age: 70
End: 2025-04-30

## 2025-05-01 ENCOUNTER — OFFICE VISIT (OUTPATIENT)
Dept: NON INVASIVE DIAGNOSTICS | Age: 70
End: 2025-05-01
Payer: MEDICARE

## 2025-05-01 VITALS
HEART RATE: 56 BPM | RESPIRATION RATE: 16 BRPM | BODY MASS INDEX: 23.55 KG/M2 | HEIGHT: 62 IN | WEIGHT: 128 LBS | DIASTOLIC BLOOD PRESSURE: 58 MMHG | SYSTOLIC BLOOD PRESSURE: 98 MMHG

## 2025-05-01 DIAGNOSIS — R00.0 SINUS TACHYCARDIA: Primary | ICD-10-CM

## 2025-05-01 PROCEDURE — 93000 ELECTROCARDIOGRAM COMPLETE: CPT | Performed by: INTERNAL MEDICINE

## 2025-05-01 PROCEDURE — 1160F RVW MEDS BY RX/DR IN RCRD: CPT | Performed by: INTERNAL MEDICINE

## 2025-05-01 PROCEDURE — 1123F ACP DISCUSS/DSCN MKR DOCD: CPT | Performed by: INTERNAL MEDICINE

## 2025-05-01 PROCEDURE — 1159F MED LIST DOCD IN RCRD: CPT | Performed by: INTERNAL MEDICINE

## 2025-05-01 PROCEDURE — 99214 OFFICE O/P EST MOD 30 MIN: CPT | Performed by: INTERNAL MEDICINE

## 2025-05-01 NOTE — PROGRESS NOTES
[facility] PHYSICIANS- [facility] [place] [place] Electrophys[facility]  Outpatient progress Note  PATIENT: Lorrie Burns  MEDICAL RECORD NUMBER: [id number]  DATE OF SERVICE:  5/1/2025  ATTENDING ELECTROPHYSIOLOGIST: Faith Quintero MD  REFERRING PHYSICIAN: No ref. provider found and Daniella Mack DO  Chief Complaint   Patient presents with    Follow-Up from Hospital     Pt has no c/c         HPI: February 2025:This is a 70 y.o. female with a history of aphasia, generalized anxiety disorder, ulcerative colitis, PSVT. Patient is known to  of [facility]. Patient isn't known to [facility].  Her PSVT was noted first during admission 1/2023 in the setting of C diff pancolitis/ulcerative colitis flare. CT imaging showed diffuse colonic flare. There was spontaneous resolution of PSVT.  saw the patient at that time and performed an echocardiogram which showed normal LVEF and a trivial pericardial effusion.  A 14 day Zio was placed 2/2023 that did show SVT and NSVT.  Patient was ordered Lopressor 50mg BID. Patient reports she's taking Metoprolol 25mg BID at home, Mesalamine QID , Prednisone 10mg BID, and Crestor.    She was evaluated at [facility] for  PSVT and NSVT around 2/2023. They didn't have much more to offer other than staying on beta blocker and obtaining a Kardia [employer], which she has not done.    Patient presented to [facility] on 2/18/25 as an outpatient for a scheduled upper and lower endoscopy due to ongoing diarrhea for 5-6weeks. Saw  who scheduled the procedure for 2/18/25. Cardiology was initially consulted for Sinus tachycardia/SVT.  EGD was WNL.  Her colonoscopy could not be completed due to persistent tachycardia.     saw patient and ordered Adenosine 6mg which was only effective for about 10 seconds. He also ordered to give an extra dose of Metoprolol XL 50mg without effectiveness. Her Metoprolol dose was increased to 100mg BID, with first dose

## 2025-06-24 ENCOUNTER — HOSPITAL ENCOUNTER (EMERGENCY)
Age: 70
Discharge: HOME | End: 2025-06-24
Attending: EMERGENCY MEDICINE
Payer: MEDICARE

## 2025-06-24 ENCOUNTER — OFFICE VISIT (OUTPATIENT)
Dept: FAMILY MEDICINE CLINIC | Age: 70
End: 2025-06-24
Payer: MEDICARE

## 2025-06-24 VITALS
WEIGHT: 130 LBS | HEIGHT: 62 IN | DIASTOLIC BLOOD PRESSURE: 77 MMHG | TEMPERATURE: 98.2 F | HEART RATE: 81 BPM | BODY MASS INDEX: 23.92 KG/M2 | SYSTOLIC BLOOD PRESSURE: 117 MMHG | RESPIRATION RATE: 16 BRPM | OXYGEN SATURATION: 98 %

## 2025-06-24 VITALS
DIASTOLIC BLOOD PRESSURE: 80 MMHG | HEART RATE: 141 BPM | WEIGHT: 123 LBS | OXYGEN SATURATION: 98 % | TEMPERATURE: 97.4 F | HEIGHT: 62 IN | BODY MASS INDEX: 22.63 KG/M2 | SYSTOLIC BLOOD PRESSURE: 124 MMHG | RESPIRATION RATE: 16 BRPM

## 2025-06-24 DIAGNOSIS — R10.84 GENERALIZED ABDOMINAL PAIN: ICD-10-CM

## 2025-06-24 DIAGNOSIS — R00.0 TACHYCARDIA: Primary | ICD-10-CM

## 2025-06-24 LAB
ANION GAP SERPL CALCULATED.3IONS-SCNC: 12 MMOL/L (ref 7–16)
BUN SERPL-MCNC: 11 MG/DL (ref 8–23)
CALCIUM SERPL-MCNC: 9.9 MG/DL (ref 8.8–10.2)
CHLORIDE SERPL-SCNC: 108 MMOL/L (ref 98–107)
CO2 SERPL-SCNC: 23 MMOL/L (ref 22–29)
CREAT SERPL-MCNC: 0.6 MG/DL (ref 0.5–1)
EKG ATRIAL RATE: 134 BPM
EKG P-R INTERVAL: 164 MS
EKG Q-T INTERVAL: 304 MS
EKG QRS DURATION: 74 MS
EKG QTC CALCULATION (BAZETT): 453 MS
EKG R AXIS: -76 DEGREES
EKG T AXIS: 244 DEGREES
EKG VENTRICULAR RATE: 134 BPM
ERYTHROCYTE [DISTWIDTH] IN BLOOD BY AUTOMATED COUNT: 12.2 % (ref 11.5–15)
GFR, ESTIMATED: >90 ML/MIN/1.73M2
GLUCOSE SERPL-MCNC: 86 MG/DL (ref 74–99)
HCT VFR BLD AUTO: 40.1 % (ref 34–48)
HGB BLD-MCNC: 13.6 G/DL (ref 11.5–15.5)
MAGNESIUM SERPL-MCNC: 1.9 MG/DL (ref 1.6–2.4)
MCH RBC QN AUTO: 30.7 PG (ref 26–35)
MCHC RBC AUTO-ENTMCNC: 33.9 G/DL (ref 32–34.5)
MCV RBC AUTO: 90.5 FL (ref 80–99.9)
PLATELET # BLD AUTO: 186 K/UL (ref 130–450)
PMV BLD AUTO: 11.2 FL (ref 7–12)
POTASSIUM SERPL-SCNC: 3.8 MMOL/L (ref 3.5–5.1)
RBC # BLD AUTO: 4.43 M/UL (ref 3.5–5.5)
SODIUM SERPL-SCNC: 143 MMOL/L (ref 136–145)
TROPONIN I SERPL HS-MCNC: 14 NG/L (ref 0–14)
WBC OTHER # BLD: 5.5 K/UL (ref 4.5–11.5)

## 2025-06-24 PROCEDURE — 2580000003 HC RX 258: Performed by: EMERGENCY MEDICINE

## 2025-06-24 PROCEDURE — 93000 ELECTROCARDIOGRAM COMPLETE: CPT | Performed by: PHYSICIAN ASSISTANT

## 2025-06-24 PROCEDURE — 6370000000 HC RX 637 (ALT 250 FOR IP): Performed by: EMERGENCY MEDICINE

## 2025-06-24 PROCEDURE — 99284 EMERGENCY DEPT VISIT MOD MDM: CPT

## 2025-06-24 PROCEDURE — 1159F MED LIST DOCD IN RCRD: CPT | Performed by: PHYSICIAN ASSISTANT

## 2025-06-24 PROCEDURE — 93005 ELECTROCARDIOGRAM TRACING: CPT | Performed by: EMERGENCY MEDICINE

## 2025-06-24 PROCEDURE — 83735 ASSAY OF MAGNESIUM: CPT

## 2025-06-24 PROCEDURE — 96361 HYDRATE IV INFUSION ADD-ON: CPT

## 2025-06-24 PROCEDURE — 80048 BASIC METABOLIC PNL TOTAL CA: CPT

## 2025-06-24 PROCEDURE — 84484 ASSAY OF TROPONIN QUANT: CPT

## 2025-06-24 PROCEDURE — 99205 OFFICE O/P NEW HI 60 MIN: CPT | Performed by: PHYSICIAN ASSISTANT

## 2025-06-24 PROCEDURE — 85027 COMPLETE CBC AUTOMATED: CPT

## 2025-06-24 PROCEDURE — 1123F ACP DISCUSS/DSCN MKR DOCD: CPT | Performed by: PHYSICIAN ASSISTANT

## 2025-06-24 PROCEDURE — 93010 ELECTROCARDIOGRAM REPORT: CPT | Performed by: INTERNAL MEDICINE

## 2025-06-24 PROCEDURE — 96360 HYDRATION IV INFUSION INIT: CPT

## 2025-06-24 PROCEDURE — 1160F RVW MEDS BY RX/DR IN RCRD: CPT | Performed by: PHYSICIAN ASSISTANT

## 2025-06-24 RX ORDER — 0.9 % SODIUM CHLORIDE 0.9 %
1000 INTRAVENOUS SOLUTION INTRAVENOUS ONCE
Status: COMPLETED | OUTPATIENT
Start: 2025-06-24 | End: 2025-06-24

## 2025-06-24 RX ORDER — FLECAINIDE ACETATE 50 MG/1
50 TABLET ORAL 2 TIMES DAILY
Status: DISCONTINUED | OUTPATIENT
Start: 2025-06-24 | End: 2025-06-24 | Stop reason: HOSPADM

## 2025-06-24 RX ORDER — METOPROLOL SUCCINATE 25 MG/1
50 TABLET, EXTENDED RELEASE ORAL ONCE
Status: COMPLETED | OUTPATIENT
Start: 2025-06-24 | End: 2025-06-24

## 2025-06-24 RX ADMIN — METOPROLOL SUCCINATE 50 MG: 25 TABLET, EXTENDED RELEASE ORAL at 11:27

## 2025-06-24 RX ADMIN — FLECAINIDE ACETATE 50 MG: 50 TABLET ORAL at 12:22

## 2025-06-24 RX ADMIN — SODIUM CHLORIDE 1000 ML: 0.9 INJECTION, SOLUTION INTRAVENOUS at 11:27

## 2025-06-24 ASSESSMENT — PAIN DESCRIPTION - FREQUENCY: FREQUENCY: CONTINUOUS

## 2025-06-24 ASSESSMENT — PAIN DESCRIPTION - PAIN TYPE: TYPE: ACUTE PAIN

## 2025-06-24 ASSESSMENT — PAIN DESCRIPTION - LOCATION: LOCATION: CHEST

## 2025-06-24 ASSESSMENT — PAIN DESCRIPTION - ONSET: ONSET: ON-GOING

## 2025-06-24 ASSESSMENT — PAIN - FUNCTIONAL ASSESSMENT
PAIN_FUNCTIONAL_ASSESSMENT: ACTIVITIES ARE NOT PREVENTED
PAIN_FUNCTIONAL_ASSESSMENT: 0-10

## 2025-06-24 ASSESSMENT — PAIN DESCRIPTION - DESCRIPTORS: DESCRIPTORS: DISCOMFORT

## 2025-06-24 ASSESSMENT — PAIN SCALES - GENERAL: PAINLEVEL_OUTOF10: 8

## 2025-06-24 NOTE — DISCHARGE INSTRUCTIONS
You have what appears to be chronic intermittent rapid heart rate.  Your flecainide and metoprolol will control this heart rate for you.  Make sure you take your medications as prescribed.

## 2025-06-24 NOTE — PROGRESS NOTES
patient will be directed to the emergency department for further evaluation and assessment.  The patient will go by private vehicle they declined EMS transportation      Clinical Impression:   Lorrie was seen today for gi problem.    Diagnoses and all orders for this visit:    Tachycardia  -     EKG 12 lead; Future  -     EKG 12 lead    Generalized abdominal pain        Go directly to the ER    SIGNATURE: Dilip Mendoza III, PA-C    This document may have been prepared at least partially through the use of voice recognition software. Although effort is taken to assure the accuracy ofthis document, it is possible that grammatical, syntax, or spelling errors may occur.     **This report was transcribed using voice recognition software. The patient (or guardian, if applicable) and other individuals in attendance with the patient were advised that if Artificial Intelligence would be utilized during this visit to record and process the conversation to generate a clinical note they would be informed prior to start of the visit. The patient (or guardian, if applicable) and other individuals in attendance at the appointment consented to the use of AI if was utilized, including the recording.  Every effort was made to ensure accuracy; however, inadvertent computerized transcription errors may be present.

## 2025-06-27 ENCOUNTER — PATIENT MESSAGE (OUTPATIENT)
Dept: PRIMARY CARE CLINIC | Age: 70
End: 2025-06-27

## 2025-08-20 RX ORDER — FLECAINIDE ACETATE 50 MG/1
50 TABLET ORAL 2 TIMES DAILY
Qty: 180 TABLET | Refills: 1 | Status: SHIPPED | OUTPATIENT
Start: 2025-08-20

## 2025-09-02 DIAGNOSIS — K51.90 MODERATE CHRONIC ULCERATIVE COLITIS WITHOUT COMPLICATION (HCC): ICD-10-CM

## 2025-09-02 RX ORDER — MESALAMINE 1.2 G/1
TABLET, DELAYED RELEASE ORAL
Qty: 120 TABLET | Refills: 3 | Status: SHIPPED | OUTPATIENT
Start: 2025-09-02

## (undated) DEVICE — GRADUATE TRIANG MEASURE 1000ML BLK PRNT

## (undated) DEVICE — FORCEPS BX L240CM JAW DIA2.4MM ORNG L CAP W/ NDL DISP RAD

## (undated) DEVICE — AIR/WATER CLEANING ADAPTER FOR OLYMPUS® GI ENDOSCOPE: Brand: BULLDOG®

## (undated) DEVICE — BITEBLOCK 54FR W/ DENT RIM BLOX

## (undated) DEVICE — ENDOSCOPIC KIT 1.1+ OP4 NO CP DE

## (undated) DEVICE — DEFENDO AIR WATER SUCTION AND BIOPSY VALVE KIT FOR  OLYMPUS: Brand: DEFENDO AIR/WATER/SUCTION AND BIOPSY VALVE

## (undated) DEVICE — CONNECTOR IRRIGATION AUXILIARY H2O JET W/ PRT MTL THRD HYDR

## (undated) DEVICE — SPONGE GZ W4XL4IN RAYON POLY CVR W/NONWOVEN FAB STRL 2/PK

## (undated) DEVICE — GAUZE,SPONGE,4"X4",8PLY,STRL,LF,10/TRAY: Brand: MEDLINE